# Patient Record
Sex: MALE | Race: WHITE | NOT HISPANIC OR LATINO | Employment: OTHER | URBAN - METROPOLITAN AREA
[De-identification: names, ages, dates, MRNs, and addresses within clinical notes are randomized per-mention and may not be internally consistent; named-entity substitution may affect disease eponyms.]

---

## 2019-07-23 ENCOUNTER — TRANSCRIBE ORDERS (OUTPATIENT)
Dept: ADMINISTRATIVE | Facility: HOSPITAL | Age: 69
End: 2019-07-23

## 2019-07-23 DIAGNOSIS — E04.2 MULTIPLE THYROID NODULES: Primary | ICD-10-CM

## 2019-08-06 ENCOUNTER — HOSPITAL ENCOUNTER (OUTPATIENT)
Dept: ULTRASOUND IMAGING | Facility: HOSPITAL | Age: 69
Discharge: HOME | End: 2019-08-06
Admitting: INTERNAL MEDICINE

## 2019-08-06 DIAGNOSIS — E04.2 MULTIPLE THYROID NODULES: ICD-10-CM

## 2019-08-06 PROCEDURE — 76536 US EXAM OF HEAD AND NECK: CPT

## 2019-12-17 ENCOUNTER — TRANSCRIBE ORDERS (OUTPATIENT)
Dept: ADMINISTRATIVE | Facility: HOSPITAL | Age: 69
End: 2019-12-17

## 2019-12-17 DIAGNOSIS — Z87.891 HISTORY OF SMOKING: Primary | ICD-10-CM

## 2019-12-19 ENCOUNTER — HOSPITAL ENCOUNTER (OUTPATIENT)
Dept: ULTRASOUND IMAGING | Facility: HOSPITAL | Age: 69
Discharge: HOME | End: 2019-12-19
Admitting: INTERNAL MEDICINE

## 2019-12-19 DIAGNOSIS — Z87.891 HISTORY OF SMOKING: ICD-10-CM

## 2019-12-19 PROCEDURE — 76706 US ABDL AORTA SCREEN AAA: CPT

## 2020-01-06 ENCOUNTER — OFFICE (OUTPATIENT)
Dept: URBAN - METROPOLITAN AREA CLINIC 75 | Facility: CLINIC | Age: 70
End: 2020-01-06

## 2020-01-06 VITALS
HEART RATE: 90 BPM | SYSTOLIC BLOOD PRESSURE: 128 MMHG | HEIGHT: 64 IN | DIASTOLIC BLOOD PRESSURE: 78 MMHG | WEIGHT: 188 LBS

## 2020-01-06 DIAGNOSIS — Z80.9 FAMILY HISTORY OF MALIGNANT NEOPLASM, UNSPECIFIED: ICD-10-CM

## 2020-01-06 DIAGNOSIS — R19.4 CHANGE IN BOWEL HABIT: ICD-10-CM

## 2020-01-06 DIAGNOSIS — K58.1 IRRITABLE BOWEL SYNDROME WITH CONSTIPATION: ICD-10-CM

## 2020-01-06 DIAGNOSIS — Z86.010 PERSONAL HISTORY OF COLONIC POLYPS: ICD-10-CM

## 2020-01-06 DIAGNOSIS — K21.9 GASTRO-ESOPHAGEAL REFLUX DISEASE WITHOUT ESOPHAGITIS: ICD-10-CM

## 2020-01-06 PROCEDURE — 99203 OFFICE O/P NEW LOW 30 MIN: CPT | Performed by: INTERNAL MEDICINE

## 2020-01-27 ENCOUNTER — OFFICE (OUTPATIENT)
Dept: URBAN - METROPOLITAN AREA CLINIC 75 | Facility: CLINIC | Age: 70
End: 2020-01-27

## 2020-01-27 VITALS — HEIGHT: 64 IN

## 2020-01-27 DIAGNOSIS — K64.1 SECOND DEGREE HEMORRHOIDS: ICD-10-CM

## 2020-01-27 PROCEDURE — 46221 LIGATION OF HEMORRHOID(S): CPT | Performed by: INTERNAL MEDICINE

## 2020-01-27 NOTE — SERVICEHPINOTES
69-year-old gentleman who has a history of polyps. He's also been having issues with hemorrhoids. They move in and out, he's had bleeding in the past. Symptoms are consistent with grade 2 internal hemorrhoids so he presents for hemorrhoid banding.

## 2020-02-10 ENCOUNTER — OFFICE (OUTPATIENT)
Dept: URBAN - METROPOLITAN AREA CLINIC 75 | Facility: CLINIC | Age: 70
End: 2020-02-10

## 2020-02-10 VITALS — HEIGHT: 64 IN

## 2020-02-10 DIAGNOSIS — K64.1 SECOND DEGREE HEMORRHOIDS: ICD-10-CM

## 2020-02-10 PROCEDURE — 46221 LIGATION OF HEMORRHOID(S): CPT | Performed by: INTERNAL MEDICINE

## 2020-02-10 NOTE — SERVICEHPINOTES
69-year-old gentleman who has a history of polyps. He's also been having issues with hemorrhoids. They move in and out, he's had bleeding in the past. Symptoms are consistent with grade 2 internal hemorrhoids so he presents for his 2nd hemorrhoid banding.

## 2020-02-24 ENCOUNTER — OFFICE (OUTPATIENT)
Dept: URBAN - METROPOLITAN AREA CLINIC 75 | Facility: CLINIC | Age: 70
End: 2020-02-24

## 2020-02-24 VITALS — HEIGHT: 64 IN

## 2020-02-24 DIAGNOSIS — K64.1 SECOND DEGREE HEMORRHOIDS: ICD-10-CM

## 2020-02-24 DIAGNOSIS — K92.1 MELENA: ICD-10-CM

## 2020-02-24 PROCEDURE — 46221 LIGATION OF HEMORRHOID(S): CPT | Performed by: INTERNAL MEDICINE

## 2020-06-10 ENCOUNTER — TRANSCRIBE ORDERS (OUTPATIENT)
Dept: ADMINISTRATIVE | Facility: HOSPITAL | Age: 70
End: 2020-06-10

## 2020-06-10 DIAGNOSIS — M25.561 ARTHRALGIA OF RIGHT KNEE: Primary | ICD-10-CM

## 2020-06-18 ENCOUNTER — APPOINTMENT (OUTPATIENT)
Dept: MRI IMAGING | Facility: HOSPITAL | Age: 70
End: 2020-06-18

## 2020-06-19 ENCOUNTER — APPOINTMENT (OUTPATIENT)
Dept: MRI IMAGING | Facility: HOSPITAL | Age: 70
End: 2020-06-19

## 2020-06-19 ENCOUNTER — HOSPITAL ENCOUNTER (OUTPATIENT)
Dept: MRI IMAGING | Facility: HOSPITAL | Age: 70
Discharge: HOME | End: 2020-06-19
Admitting: ORTHOPAEDIC SURGERY

## 2020-06-19 DIAGNOSIS — M25.561 ARTHRALGIA OF RIGHT KNEE: ICD-10-CM

## 2020-06-19 PROCEDURE — 73721 MRI JNT OF LWR EXTRE W/O DYE: CPT

## 2020-07-24 ENCOUNTER — TRANSCRIBE ORDERS (OUTPATIENT)
Dept: ADMINISTRATIVE | Facility: HOSPITAL | Age: 70
End: 2020-07-24

## 2020-07-24 DIAGNOSIS — E04.2 MULTIPLE THYROID NODULES: Primary | ICD-10-CM

## 2020-07-30 ENCOUNTER — HOSPITAL ENCOUNTER (OUTPATIENT)
Dept: ULTRASOUND IMAGING | Facility: HOSPITAL | Age: 70
Discharge: HOME | End: 2020-07-30
Admitting: INTERNAL MEDICINE

## 2020-07-30 DIAGNOSIS — E04.2 MULTIPLE THYROID NODULES: ICD-10-CM

## 2020-07-30 PROCEDURE — 76536 US EXAM OF HEAD AND NECK: CPT

## 2020-08-14 ENCOUNTER — TRANSCRIBE ORDERS (OUTPATIENT)
Dept: PREADMISSION TESTING | Facility: HOSPITAL | Age: 70
End: 2020-08-14

## 2020-08-14 DIAGNOSIS — Z01.818 OTHER SPECIFIED PRE-OPERATIVE EXAMINATION: Primary | ICD-10-CM

## 2020-08-18 ENCOUNTER — HOSPITAL ENCOUNTER (OUTPATIENT)
Dept: GENERAL RADIOLOGY | Facility: HOSPITAL | Age: 70
Discharge: HOME | End: 2020-08-18

## 2020-08-18 ENCOUNTER — HOSPITAL ENCOUNTER (OUTPATIENT)
Dept: GENERAL RADIOLOGY | Facility: HOSPITAL | Age: 70
Discharge: HOME | End: 2020-08-18
Admitting: ORTHOPAEDIC SURGERY

## 2020-08-18 ENCOUNTER — APPOINTMENT (OUTPATIENT)
Dept: PREADMISSION TESTING | Facility: HOSPITAL | Age: 70
End: 2020-08-18

## 2020-08-18 VITALS — WEIGHT: 193 LBS | HEIGHT: 65 IN | BODY MASS INDEX: 32.15 KG/M2

## 2020-08-18 DIAGNOSIS — M17.11 PRIMARY OSTEOARTHRITIS OF RIGHT KNEE: ICD-10-CM

## 2020-08-18 LAB
ALBUMIN SERPL-MCNC: 4.3 G/DL (ref 3.5–5.2)
ALBUMIN/GLOB SERPL: 1.6 G/DL
ALP SERPL-CCNC: 64 U/L (ref 39–117)
ALT SERPL W P-5'-P-CCNC: 26 U/L (ref 1–41)
ANION GAP SERPL CALCULATED.3IONS-SCNC: 8 MMOL/L (ref 5–15)
AST SERPL-CCNC: 25 U/L (ref 1–40)
BILIRUB SERPL-MCNC: 0.9 MG/DL (ref 0–1.2)
BILIRUB UR QL STRIP: NEGATIVE
BUN SERPL-MCNC: 19 MG/DL (ref 8–23)
BUN/CREAT SERPL: 20.9 (ref 7–25)
CALCIUM SPEC-SCNC: 9.6 MG/DL (ref 8.6–10.5)
CHLORIDE SERPL-SCNC: 103 MMOL/L (ref 98–107)
CLARITY UR: CLEAR
CO2 SERPL-SCNC: 29 MMOL/L (ref 22–29)
COLOR UR: YELLOW
CREAT SERPL-MCNC: 0.91 MG/DL (ref 0.76–1.27)
DEPRECATED RDW RBC AUTO: 43.5 FL (ref 37–54)
ERYTHROCYTE [DISTWIDTH] IN BLOOD BY AUTOMATED COUNT: 13.1 % (ref 12.3–15.4)
GFR SERPL CREATININE-BSD FRML MDRD: 83 ML/MIN/1.73
GLOBULIN UR ELPH-MCNC: 2.7 GM/DL
GLUCOSE SERPL-MCNC: 109 MG/DL (ref 65–99)
GLUCOSE UR STRIP-MCNC: NEGATIVE MG/DL
HCT VFR BLD AUTO: 38.4 % (ref 37.5–51)
HGB BLD-MCNC: 13.3 G/DL (ref 13–17.7)
HGB UR QL STRIP.AUTO: NEGATIVE
INR PPP: 0.97 (ref 0.9–1.1)
KETONES UR QL STRIP: NEGATIVE
LEUKOCYTE ESTERASE UR QL STRIP.AUTO: NEGATIVE
MCH RBC QN AUTO: 31.3 PG (ref 26.6–33)
MCHC RBC AUTO-ENTMCNC: 34.6 G/DL (ref 31.5–35.7)
MCV RBC AUTO: 90.4 FL (ref 79–97)
NITRITE UR QL STRIP: NEGATIVE
PH UR STRIP.AUTO: 6.5 [PH] (ref 5–8)
PLATELET # BLD AUTO: 205 10*3/MM3 (ref 140–450)
PMV BLD AUTO: 9.8 FL (ref 6–12)
POTASSIUM SERPL-SCNC: 4.1 MMOL/L (ref 3.5–5.2)
PROT SERPL-MCNC: 7 G/DL (ref 6–8.5)
PROT UR QL STRIP: NEGATIVE
PROTHROMBIN TIME: 12.8 SECONDS (ref 11.7–14.2)
RBC # BLD AUTO: 4.25 10*6/MM3 (ref 4.14–5.8)
SODIUM SERPL-SCNC: 140 MMOL/L (ref 136–145)
SP GR UR STRIP: 1.01 (ref 1–1.03)
UROBILINOGEN UR QL STRIP: NORMAL
WBC # BLD AUTO: 6.78 10*3/MM3 (ref 3.4–10.8)

## 2020-08-18 PROCEDURE — 80053 COMPREHEN METABOLIC PANEL: CPT | Performed by: ORTHOPAEDIC SURGERY

## 2020-08-18 PROCEDURE — 73560 X-RAY EXAM OF KNEE 1 OR 2: CPT

## 2020-08-18 PROCEDURE — 71046 X-RAY EXAM CHEST 2 VIEWS: CPT

## 2020-08-18 PROCEDURE — 85610 PROTHROMBIN TIME: CPT | Performed by: ORTHOPAEDIC SURGERY

## 2020-08-18 PROCEDURE — 36415 COLL VENOUS BLD VENIPUNCTURE: CPT

## 2020-08-18 PROCEDURE — 93010 ELECTROCARDIOGRAM REPORT: CPT | Performed by: INTERNAL MEDICINE

## 2020-08-18 PROCEDURE — 93005 ELECTROCARDIOGRAM TRACING: CPT

## 2020-08-18 PROCEDURE — 85027 COMPLETE CBC AUTOMATED: CPT | Performed by: ORTHOPAEDIC SURGERY

## 2020-08-18 PROCEDURE — 81003 URINALYSIS AUTO W/O SCOPE: CPT | Performed by: ORTHOPAEDIC SURGERY

## 2020-08-18 RX ORDER — TRIAMCINOLONE ACETONIDE 55 UG/1
2 SPRAY, METERED NASAL NIGHTLY
COMMUNITY

## 2020-08-18 RX ORDER — OMEPRAZOLE 20 MG/1
20 CAPSULE, DELAYED RELEASE ORAL
COMMUNITY

## 2020-08-18 RX ORDER — PHENOL 1.4 %
10 AEROSOL, SPRAY (ML) MUCOUS MEMBRANE NIGHTLY
COMMUNITY

## 2020-08-18 RX ORDER — ASPIRIN 81 MG/1
81 TABLET ORAL DAILY
COMMUNITY
End: 2020-09-03 | Stop reason: HOSPADM

## 2020-08-18 RX ORDER — DEXTRIN 3 G/3.5 G
POWDER (GRAM) ORAL DAILY
COMMUNITY

## 2020-08-18 ASSESSMENT — KOOS JR
KOOS JR SCORE: 57.14
KOOS JR SCORE: 12

## 2020-08-18 NOTE — DISCHARGE INSTRUCTIONS
Take the following medications the morning of surgery:  NONE    If you are on prescription narcotic pain medication to control your pain you may also take that medication the morning of surgery.    General Instructions: CLEAR LIQUIDS UNTIL 4:15 AM MORNING OF SURGERY  • Do not eat solid food after midnight the night before surgery.  • You may drink clear liquids day of surgery but must stop at least one hour before your hospital arrival time.  • It is beneficial for you to have a clear drink that contains carbohydrates the day of surgery.  We suggest a 12 to 20 ounce bottle of Gatorade or Powerade for non-diabetic patients or a 12 to 20 ounce bottle of G2 or Powerade Zero for diabetic patients. (Pediatric patients, are not advised to drink a 12 to 20 ounce carbohydrate drink)    Clear liquids are liquids you can see through.  Nothing red in color.     Plain water                               Sports drinks  Sodas                                   Gelatin (Jell-O)  Fruit juices without pulp such as white grape juice and apple juice  Popsicles that contain no fruit or yogurt  Tea or coffee (no cream or milk added)  Gatorade / Powerade  G2 / Powerade Zero    • Infants may have breast milk up to four hours before surgery.  • Infants drinking formula may drink formula up to six hours before surgery.   • Patients who avoid smoking, chewing tobacco and alcohol for 4 weeks prior to surgery have a reduced risk of post-operative complications.  Quit smoking as many days before surgery as you can.  • Do not smoke, use chewing tobacco or drink alcohol the day of surgery.   • If applicable bring your C-PAP/ BI-PAP machine.  • Bring any papers given to you in the doctor’s office.  • Wear clean comfortable clothes.  • Do not wear contact lenses, false eyelashes or make-up.  Bring a case for your glasses.   • Bring crutches or walker if applicable.  • Remove all piercings.  Leave jewelry and any other valuables at home.  • Hair  extensions with metal clips must be removed prior to surgery.  • The Pre-Admission Testing nurse will instruct you to bring medications if unable to obtain an accurate list in Pre-Admission Testing.        If you were given a blood bank ID arm band remember to bring it with you the day of surgery.    Preventing a Surgical Site Infection:  • For 2 to 3 days before surgery, avoid shaving with a razor because the razor can irritate skin and make it easier to develop an infection.    • Any areas of open skin can increase the risk of a post-operative wound infection by allowing bacteria to enter and travel throughout the body.  Notify your surgeon if you have any skin wounds / rashes even if it is not near the expected surgical site.  The area will need assessed to determine if surgery should be delayed until it is healed.  • The night prior to surgery shower using a fresh bar of anti-bacterial soap (such as Dial) and clean washcloth.  Sleep in a clean bed with clean clothing.  Do not allow pets to sleep with you.  • Shower on the morning of surgery using a fresh bar of anti-bacterial soap (such as Dial) and clean washcloth.  Dry with a clean towel and dress in clean clothing.  • Ask your surgeon if you will be receiving antibiotics prior to surgery.  • Make sure you, your family, and all healthcare providers clean their hands with soap and water or an alcohol based hand  before caring for you or your wound.    Day of surgery: 9/1/2020 ARRIVAL TIME 5:15 AM  Your arrival time is approximately two hours before your scheduled surgery time.  Upon arrival, a Pre-op nurse and Anesthesiologist will review your health history, obtain vital signs, and answer questions you may have.  The only belongings needed at this time will be a list of your home medications and if applicable your C-PAP/BI-PAP machine.  If you are staying overnight your family can leave the rest of your belongings in the car and bring them to your  room later.  A Pre-op nurse will start an IV and you may receive medication in preparation for surgery, including something to help you relax.  Your family will be able to see you in the Pre-op area.  Two visitors at a time will be allowed in the Pre-op room.  While you are in surgery your family should notify the waiting room  if they leave the waiting room area and provide a contact phone number.    Please be aware that surgery does come with discomfort.  We want to make every effort to control your discomfort so please discuss any uncontrolled symptoms with your nurse.   Your doctor will most likely have prescribed pain medications.      If you are going home after surgery you will receive individualized written care instructions before being discharged.  A responsible adult must drive you to and from the hospital on the day of your surgery and stay with you for 24 hours.    If you are staying overnight following surgery, you will be transported to your hospital room following the recovery period.  [facility] has all private rooms.    If you have any questions please call Pre-Admission Testing at [phone].  Deductibles and co-payments are collected on the day of service. Please be prepared to pay the required co-pay, deductible or deposit on the day of service as defined by your plan.    Patient Education for Self-Quarantine Process    Following your COVID testing, we strongly recommend that you do not leave your home after you have been tested for COVID except to get medical care. This includes not going to work, school or to public areas.  If this is not possible for you to do please limit your activities to only required outings.  Be sure to wear a mask when you are with other people, practice social distancing and wash your hands frequently.      The following items provide additional details to keep you safe.  • Wash your hands with soap and water frequently for at least 20  seconds.   • Avoid touching your eyes, nose and mouth with unwashed hands.  • Do not share anything - utensils, towels, food from the same bowl.   • Have your own utensils, drinking glass, dishes, towels and bedding.   • Do not have visitors.   • Do use FaceTime to stay in touch with family and friends.  • You should stay in a specific room away from others if possible.   • Stay at least 6 feet away from others in the home if you cannot have a dedicated room to yourself.   • Do not snuggle with your pet. While the CDC says there is no evidence that pets can spread COVID-19 or be infected from humans, it is probably best to avoid “petting, snuggling, being kissed or licked and sharing food (during self-quarantine)”, according to the CDC.   • Sanitize household surfaces daily. Include all high touch areas (door handles, light switches, phones, countertops, etc.)  • Do not share a bathroom with others, if possible.   • Wear a mask around others in your home if you are unable to stay in a separate room or 6 feet apart. If  you are unable to wear a mask, have your family member wear a mask if they must be within 6 feet of you.   Call your surgeon immediately if you experience any of the following symptoms:  • Sore Throat  • Shortness of Breath or difficulty breathing  • Cough  • Chills  • Body soreness or muscle pain  • Headache  • Fever  • New loss of taste or smell  • Do not arrive for your surgery ill.  Your procedure will need to be rescheduled to another time.  You will need to call your physician before the day of surgery to avoid any unnecessary exposure to hospital staff as well as other patients.    CHLORHEXIDINE CLOTH INSTRUCTIONS  The morning of surgery follow these instructions using the Chlorhexidine cloths you've been given.  These steps reduce bacteria on the body.  Do not use the cloths near your eyes, ears mouth, genitalia or on open wounds.  Throw the cloths away after use but do not try to flush them  down a toilet.      • Open and remove one cloth at a time from the package.    • Leave the cloth unfolded and begin the bathing.  • Massage the skin with the cloths using gentle pressure to remove bacteria.  Do not scrub harshly.   • Follow the steps below with one 2% CHG cloth per area (6 total cloths).  • One cloth for neck, shoulders and chest.  • One cloth for both arms, hands, fingers and underarms (do underarms last).  • One cloth for the abdomen followed by groin.  • One cloth for right leg and foot including between the toes.  • One cloth for left leg and foot including between the toes.  • The last cloth is to be used for the back of the neck, back and buttocks.    Allow the CHG to air dry 3 minutes on the skin which will give it time to work and decrease the chance of irritation.  The skin may feel sticky until it is dry.  Do not rinse with water or any other liquid or you will lose the beneficial effects of the CHG.  If mild skin irritation occurs, do rinse the skin to remove the CHG.  Report this to the nurse at time of admission.  Do not apply lotions, creams, ointments, deodorants or perfumes after using the clothes. Dress in clean clothes before coming to the hospital.    BACTROBAN NASAL OINTMENT  There are many germs normally in your nose. Bactroban is an ointment that will help reduce these germs. Please follow these instructions for Bactroban use:      ____The day before surgery in the morning  Date________    ____The day before surgery in the evening              Date________    ____The day of surgery in the morning    Date________    **Squirt ½ package of Bactroban Ointment onto a cotton applicator and apply to inside of 1st nostril.  Squirt the remaining Bactroban and apply to the inside of the other nostril.

## 2020-08-29 ENCOUNTER — LAB (OUTPATIENT)
Dept: LAB | Facility: HOSPITAL | Age: 70
End: 2020-08-29

## 2020-08-29 DIAGNOSIS — Z01.818 OTHER SPECIFIED PRE-OPERATIVE EXAMINATION: ICD-10-CM

## 2020-08-29 PROCEDURE — C9803 HOPD COVID-19 SPEC COLLECT: HCPCS

## 2020-08-29 PROCEDURE — U0004 COV-19 TEST NON-CDC HGH THRU: HCPCS

## 2020-08-31 LAB
REF LAB TEST METHOD: NORMAL
SARS-COV-2 RNA RESP QL NAA+PROBE: NOT DETECTED

## 2020-09-01 ENCOUNTER — HOSPITAL ENCOUNTER (INPATIENT)
Facility: HOSPITAL | Age: 70
LOS: 2 days | Discharge: HOME HEALTH | End: 2020-09-03
Attending: ORTHOPAEDIC SURGERY | Admitting: ORTHOPAEDIC SURGERY

## 2020-09-01 ENCOUNTER — ANESTHESIA (OUTPATIENT)
Dept: PERIOP | Facility: HOSPITAL | Age: 70
End: 2020-09-01

## 2020-09-01 ENCOUNTER — APPOINTMENT (OUTPATIENT)
Dept: GENERAL RADIOLOGY | Facility: HOSPITAL | Age: 70
End: 2020-09-01

## 2020-09-01 ENCOUNTER — ANESTHESIA EVENT (OUTPATIENT)
Dept: PERIOP | Facility: HOSPITAL | Age: 70
End: 2020-09-01

## 2020-09-01 DIAGNOSIS — M17.11 PRIMARY OSTEOARTHRITIS OF RIGHT KNEE: Primary | ICD-10-CM

## 2020-09-01 LAB — GLUCOSE BLDC GLUCOMTR-MCNC: 105 MG/DL (ref 70–130)

## 2020-09-01 PROCEDURE — 25010000002 HYDROMORPHONE PER 4 MG: Performed by: NURSE ANESTHETIST, CERTIFIED REGISTERED

## 2020-09-01 PROCEDURE — 0SRC0J9 REPLACEMENT OF RIGHT KNEE JOINT WITH SYNTHETIC SUBSTITUTE, CEMENTED, OPEN APPROACH: ICD-10-PCS | Performed by: ORTHOPAEDIC SURGERY

## 2020-09-01 PROCEDURE — C1776 JOINT DEVICE (IMPLANTABLE): HCPCS | Performed by: ORTHOPAEDIC SURGERY

## 2020-09-01 PROCEDURE — 25010000002 FENTANYL CITRATE (PF) 100 MCG/2ML SOLUTION: Performed by: ANESTHESIOLOGY

## 2020-09-01 PROCEDURE — 25010000002 MIDAZOLAM PER 1 MG: Performed by: ANESTHESIOLOGY

## 2020-09-01 PROCEDURE — 25010000002 KETOROLAC TROMETHAMINE PER 15 MG: Performed by: ORTHOPAEDIC SURGERY

## 2020-09-01 PROCEDURE — 25010000002 ROPIVACAINE PER 1 MG: Performed by: ORTHOPAEDIC SURGERY

## 2020-09-01 PROCEDURE — 94799 UNLISTED PULMONARY SVC/PX: CPT

## 2020-09-01 PROCEDURE — 25010000003 MEPIVACAINE PER 10 ML: Performed by: ANESTHESIOLOGY

## 2020-09-01 PROCEDURE — 73560 X-RAY EXAM OF KNEE 1 OR 2: CPT

## 2020-09-01 PROCEDURE — 97161 PT EVAL LOW COMPLEX 20 MIN: CPT

## 2020-09-01 PROCEDURE — 25010000002 DEXAMETHASONE PER 1 MG: Performed by: NURSE ANESTHETIST, CERTIFIED REGISTERED

## 2020-09-01 PROCEDURE — 25010000002 PROPOFOL 10 MG/ML EMULSION: Performed by: NURSE ANESTHETIST, CERTIFIED REGISTERED

## 2020-09-01 PROCEDURE — 25010000003 CEFAZOLIN IN DEXTROSE 2-4 GM/100ML-% SOLUTION: Performed by: ORTHOPAEDIC SURGERY

## 2020-09-01 PROCEDURE — 25010000002 FENTANYL CITRATE (PF) 100 MCG/2ML SOLUTION: Performed by: NURSE ANESTHETIST, CERTIFIED REGISTERED

## 2020-09-01 PROCEDURE — 97110 THERAPEUTIC EXERCISES: CPT

## 2020-09-01 PROCEDURE — 25010000002 ONDANSETRON PER 1 MG: Performed by: NURSE ANESTHETIST, CERTIFIED REGISTERED

## 2020-09-01 PROCEDURE — 25010000002 EPINEPHRINE 30 MG/30ML SOLUTION 30 ML VIAL: Performed by: ORTHOPAEDIC SURGERY

## 2020-09-01 PROCEDURE — C1713 ANCHOR/SCREW BN/BN,TIS/BN: HCPCS | Performed by: ORTHOPAEDIC SURGERY

## 2020-09-01 PROCEDURE — 76942 ECHO GUIDE FOR BIOPSY: CPT | Performed by: ORTHOPAEDIC SURGERY

## 2020-09-01 PROCEDURE — 25010000002 NEOSTIGMINE PER 0.5 MG: Performed by: NURSE ANESTHETIST, CERTIFIED REGISTERED

## 2020-09-01 PROCEDURE — 82962 GLUCOSE BLOOD TEST: CPT

## 2020-09-01 PROCEDURE — 25010000002 ROPIVACAINE PER 1 MG: Performed by: ANESTHESIOLOGY

## 2020-09-01 PROCEDURE — 25010000002 MORPHINE (PF) 10 MG/ML SOLUTION 1 ML CARTRIDGE: Performed by: ORTHOPAEDIC SURGERY

## 2020-09-01 PROCEDURE — 25010000002 ONDANSETRON PER 1 MG: Performed by: ORTHOPAEDIC SURGERY

## 2020-09-01 DEVICE — IMPLANTABLE DEVICE
Type: IMPLANTABLE DEVICE | Site: KNEE | Status: FUNCTIONAL
Brand: VANGUARD® KNEE SYSTEM

## 2020-09-01 DEVICE — CMT BONE R 1X40: Type: IMPLANTABLE DEVICE | Site: KNEE | Status: FUNCTIONAL

## 2020-09-01 DEVICE — CAP TOTL KN CMT PREMIUM: Type: IMPLANTABLE DEVICE | Site: KNEE | Status: FUNCTIONAL

## 2020-09-01 DEVICE — IMPLANTABLE DEVICE
Type: IMPLANTABLE DEVICE | Site: KNEE | Status: FUNCTIONAL
Brand: BIOMET® KNEE SYSTEM

## 2020-09-01 DEVICE — IMPLANTABLE DEVICE
Type: IMPLANTABLE DEVICE | Site: KNEE | Status: FUNCTIONAL
Brand: BIOMET KNEE SYSTEM

## 2020-09-01 RX ORDER — ASPIRIN 325 MG
325 TABLET, DELAYED RELEASE (ENTERIC COATED) ORAL 2 TIMES DAILY WITH MEALS
Status: DISCONTINUED | OUTPATIENT
Start: 2020-09-01 | End: 2020-09-03 | Stop reason: HOSPADM

## 2020-09-01 RX ORDER — HYDROMORPHONE HCL 110MG/55ML
PATIENT CONTROLLED ANALGESIA SYRINGE INTRAVENOUS AS NEEDED
Status: DISCONTINUED | OUTPATIENT
Start: 2020-09-01 | End: 2020-09-01 | Stop reason: SURG

## 2020-09-01 RX ORDER — SODIUM CHLORIDE, SODIUM LACTATE, POTASSIUM CHLORIDE, CALCIUM CHLORIDE 600; 310; 30; 20 MG/100ML; MG/100ML; MG/100ML; MG/100ML
9 INJECTION, SOLUTION INTRAVENOUS CONTINUOUS
Status: DISCONTINUED | OUTPATIENT
Start: 2020-09-01 | End: 2020-09-03 | Stop reason: HOSPADM

## 2020-09-01 RX ORDER — ONDANSETRON 2 MG/ML
4 INJECTION INTRAMUSCULAR; INTRAVENOUS EVERY 6 HOURS PRN
Status: DISCONTINUED | OUTPATIENT
Start: 2020-09-01 | End: 2020-09-03 | Stop reason: HOSPADM

## 2020-09-01 RX ORDER — IPRATROPIUM BROMIDE AND ALBUTEROL SULFATE 2.5; .5 MG/3ML; MG/3ML
3 SOLUTION RESPIRATORY (INHALATION) ONCE AS NEEDED
Status: DISCONTINUED | OUTPATIENT
Start: 2020-09-01 | End: 2020-09-01 | Stop reason: HOSPADM

## 2020-09-01 RX ORDER — HYDROMORPHONE HYDROCHLORIDE 1 MG/ML
0.5 INJECTION, SOLUTION INTRAMUSCULAR; INTRAVENOUS; SUBCUTANEOUS
Status: DISCONTINUED | OUTPATIENT
Start: 2020-09-01 | End: 2020-09-01 | Stop reason: HOSPADM

## 2020-09-01 RX ORDER — GLYCOPYRROLATE 0.2 MG/ML
INJECTION INTRAMUSCULAR; INTRAVENOUS AS NEEDED
Status: DISCONTINUED | OUTPATIENT
Start: 2020-09-01 | End: 2020-09-01 | Stop reason: SURG

## 2020-09-01 RX ORDER — FAMOTIDINE 10 MG/ML
20 INJECTION, SOLUTION INTRAVENOUS ONCE
Status: COMPLETED | OUTPATIENT
Start: 2020-09-01 | End: 2020-09-01

## 2020-09-01 RX ORDER — CLINDAMYCIN PHOSPHATE 900 MG/50ML
900 INJECTION INTRAVENOUS EVERY 8 HOURS
Status: COMPLETED | OUTPATIENT
Start: 2020-09-01 | End: 2020-09-02

## 2020-09-01 RX ORDER — SODIUM CHLORIDE 0.9 % (FLUSH) 0.9 %
3 SYRINGE (ML) INJECTION EVERY 12 HOURS SCHEDULED
Status: DISCONTINUED | OUTPATIENT
Start: 2020-09-01 | End: 2020-09-01 | Stop reason: HOSPADM

## 2020-09-01 RX ORDER — HYDROCODONE BITARTRATE AND ACETAMINOPHEN 7.5; 325 MG/1; MG/1
1 TABLET ORAL ONCE AS NEEDED
Status: DISCONTINUED | OUTPATIENT
Start: 2020-09-01 | End: 2020-09-01 | Stop reason: HOSPADM

## 2020-09-01 RX ORDER — PANTOPRAZOLE SODIUM 40 MG/1
40 TABLET, DELAYED RELEASE ORAL EVERY MORNING
Status: DISCONTINUED | OUTPATIENT
Start: 2020-09-02 | End: 2020-09-03 | Stop reason: HOSPADM

## 2020-09-01 RX ORDER — LABETALOL HYDROCHLORIDE 5 MG/ML
5 INJECTION, SOLUTION INTRAVENOUS
Status: DISCONTINUED | OUTPATIENT
Start: 2020-09-01 | End: 2020-09-01 | Stop reason: HOSPADM

## 2020-09-01 RX ORDER — FLUTICASONE PROPIONATE 50 MCG
2 SPRAY, SUSPENSION (ML) NASAL DAILY
Status: DISCONTINUED | OUTPATIENT
Start: 2020-09-01 | End: 2020-09-03 | Stop reason: HOSPADM

## 2020-09-01 RX ORDER — ROCURONIUM BROMIDE 10 MG/ML
INJECTION, SOLUTION INTRAVENOUS AS NEEDED
Status: DISCONTINUED | OUTPATIENT
Start: 2020-09-01 | End: 2020-09-01 | Stop reason: SURG

## 2020-09-01 RX ORDER — OXYCODONE HYDROCHLORIDE AND ACETAMINOPHEN 5; 325 MG/1; MG/1
1-2 TABLET ORAL EVERY 4 HOURS PRN
Qty: 84 TABLET | Refills: 0 | Status: SHIPPED | OUTPATIENT
Start: 2020-09-01

## 2020-09-01 RX ORDER — LIDOCAINE HYDROCHLORIDE 10 MG/ML
0.5 INJECTION, SOLUTION EPIDURAL; INFILTRATION; INTRACAUDAL; PERINEURAL ONCE AS NEEDED
Status: DISCONTINUED | OUTPATIENT
Start: 2020-09-01 | End: 2020-09-01 | Stop reason: HOSPADM

## 2020-09-01 RX ORDER — OXYCODONE HYDROCHLORIDE AND ACETAMINOPHEN 5; 325 MG/1; MG/1
1 TABLET ORAL EVERY 4 HOURS PRN
Status: DISCONTINUED | OUTPATIENT
Start: 2020-09-01 | End: 2020-09-03 | Stop reason: HOSPADM

## 2020-09-01 RX ORDER — MAGNESIUM HYDROXIDE 1200 MG/15ML
LIQUID ORAL AS NEEDED
Status: DISCONTINUED | OUTPATIENT
Start: 2020-09-01 | End: 2020-09-01 | Stop reason: HOSPADM

## 2020-09-01 RX ORDER — FLUMAZENIL 0.1 MG/ML
0.2 INJECTION INTRAVENOUS AS NEEDED
Status: DISCONTINUED | OUTPATIENT
Start: 2020-09-01 | End: 2020-09-01 | Stop reason: HOSPADM

## 2020-09-01 RX ORDER — DEXAMETHASONE SODIUM PHOSPHATE 10 MG/ML
INJECTION INTRAMUSCULAR; INTRAVENOUS AS NEEDED
Status: DISCONTINUED | OUTPATIENT
Start: 2020-09-01 | End: 2020-09-01 | Stop reason: SURG

## 2020-09-01 RX ORDER — ROPIVACAINE HYDROCHLORIDE 5 MG/ML
INJECTION, SOLUTION EPIDURAL; INFILTRATION; PERINEURAL
Status: COMPLETED | OUTPATIENT
Start: 2020-09-01 | End: 2020-09-01

## 2020-09-01 RX ORDER — MIDAZOLAM HYDROCHLORIDE 1 MG/ML
1 INJECTION INTRAMUSCULAR; INTRAVENOUS
Status: DISCONTINUED | OUTPATIENT
Start: 2020-09-01 | End: 2020-09-01 | Stop reason: HOSPADM

## 2020-09-01 RX ORDER — TRANEXAMIC ACID 100 MG/ML
INJECTION, SOLUTION INTRAVENOUS AS NEEDED
Status: DISCONTINUED | OUTPATIENT
Start: 2020-09-01 | End: 2020-09-01 | Stop reason: SURG

## 2020-09-01 RX ORDER — DIPHENHYDRAMINE HYDROCHLORIDE 50 MG/ML
12.5 INJECTION INTRAMUSCULAR; INTRAVENOUS
Status: DISCONTINUED | OUTPATIENT
Start: 2020-09-01 | End: 2020-09-01 | Stop reason: HOSPADM

## 2020-09-01 RX ORDER — PROMETHAZINE HYDROCHLORIDE 25 MG/1
25 SUPPOSITORY RECTAL ONCE AS NEEDED
Status: DISCONTINUED | OUTPATIENT
Start: 2020-09-01 | End: 2020-09-01 | Stop reason: HOSPADM

## 2020-09-01 RX ORDER — NALOXONE HCL 0.4 MG/ML
0.4 VIAL (ML) INJECTION
Status: DISCONTINUED | OUTPATIENT
Start: 2020-09-01 | End: 2020-09-03 | Stop reason: HOSPADM

## 2020-09-01 RX ORDER — CLOBETASOL PROPIONATE 0.5 MG/G
OINTMENT TOPICAL EVERY 12 HOURS SCHEDULED
Status: DISCONTINUED | OUTPATIENT
Start: 2020-09-01 | End: 2020-09-03 | Stop reason: HOSPADM

## 2020-09-01 RX ORDER — CHOLECALCIFEROL (VITAMIN D3) 125 MCG
5 CAPSULE ORAL NIGHTLY PRN
Status: DISCONTINUED | OUTPATIENT
Start: 2020-09-01 | End: 2020-09-03 | Stop reason: HOSPADM

## 2020-09-01 RX ORDER — DIAZEPAM 5 MG/1
5 TABLET ORAL EVERY 6 HOURS PRN
Status: DISCONTINUED | OUTPATIENT
Start: 2020-09-01 | End: 2020-09-03 | Stop reason: HOSPADM

## 2020-09-01 RX ORDER — EPHEDRINE SULFATE 50 MG/ML
5 INJECTION, SOLUTION INTRAVENOUS ONCE AS NEEDED
Status: DISCONTINUED | OUTPATIENT
Start: 2020-09-01 | End: 2020-09-01 | Stop reason: HOSPADM

## 2020-09-01 RX ORDER — PROPOFOL 10 MG/ML
VIAL (ML) INTRAVENOUS AS NEEDED
Status: DISCONTINUED | OUTPATIENT
Start: 2020-09-01 | End: 2020-09-01 | Stop reason: SURG

## 2020-09-01 RX ORDER — PROMETHAZINE HYDROCHLORIDE 25 MG/1
25 TABLET ORAL ONCE AS NEEDED
Status: DISCONTINUED | OUTPATIENT
Start: 2020-09-01 | End: 2020-09-01 | Stop reason: HOSPADM

## 2020-09-01 RX ORDER — SODIUM CHLORIDE 0.9 % (FLUSH) 0.9 %
3 SYRINGE (ML) INJECTION EVERY 12 HOURS SCHEDULED
Status: DISCONTINUED | OUTPATIENT
Start: 2020-09-01 | End: 2020-09-03 | Stop reason: HOSPADM

## 2020-09-01 RX ORDER — KETOROLAC TROMETHAMINE 30 MG/ML
15 INJECTION, SOLUTION INTRAMUSCULAR; INTRAVENOUS EVERY 8 HOURS PRN
Status: DISCONTINUED | OUTPATIENT
Start: 2020-09-01 | End: 2020-09-03 | Stop reason: HOSPADM

## 2020-09-01 RX ORDER — PROMETHAZINE HYDROCHLORIDE 12.5 MG/1
12.5 TABLET ORAL EVERY 6 HOURS PRN
Status: DISCONTINUED | OUTPATIENT
Start: 2020-09-01 | End: 2020-09-03 | Stop reason: HOSPADM

## 2020-09-01 RX ORDER — DIPHENHYDRAMINE HCL 25 MG
25 CAPSULE ORAL
Status: DISCONTINUED | OUTPATIENT
Start: 2020-09-01 | End: 2020-09-01 | Stop reason: HOSPADM

## 2020-09-01 RX ORDER — OXYCODONE HYDROCHLORIDE AND ACETAMINOPHEN 5; 325 MG/1; MG/1
2 TABLET ORAL EVERY 4 HOURS PRN
Status: DISCONTINUED | OUTPATIENT
Start: 2020-09-01 | End: 2020-09-03 | Stop reason: HOSPADM

## 2020-09-01 RX ORDER — SODIUM CHLORIDE 0.9 % (FLUSH) 0.9 %
3-10 SYRINGE (ML) INJECTION AS NEEDED
Status: DISCONTINUED | OUTPATIENT
Start: 2020-09-01 | End: 2020-09-01 | Stop reason: HOSPADM

## 2020-09-01 RX ORDER — NALOXONE HCL 0.4 MG/ML
0.2 VIAL (ML) INJECTION AS NEEDED
Status: DISCONTINUED | OUTPATIENT
Start: 2020-09-01 | End: 2020-09-01 | Stop reason: HOSPADM

## 2020-09-01 RX ORDER — LIDOCAINE HYDROCHLORIDE 20 MG/ML
INJECTION, SOLUTION INFILTRATION; PERINEURAL AS NEEDED
Status: DISCONTINUED | OUTPATIENT
Start: 2020-09-01 | End: 2020-09-01 | Stop reason: SURG

## 2020-09-01 RX ORDER — MORPHINE SULFATE 2 MG/ML
4 INJECTION, SOLUTION INTRAMUSCULAR; INTRAVENOUS
Status: DISCONTINUED | OUTPATIENT
Start: 2020-09-01 | End: 2020-09-03 | Stop reason: HOSPADM

## 2020-09-01 RX ORDER — ONDANSETRON 2 MG/ML
INJECTION INTRAMUSCULAR; INTRAVENOUS AS NEEDED
Status: DISCONTINUED | OUTPATIENT
Start: 2020-09-01 | End: 2020-09-01 | Stop reason: SURG

## 2020-09-01 RX ORDER — ACETAMINOPHEN 325 MG/1
325 TABLET ORAL EVERY 4 HOURS PRN
Status: DISCONTINUED | OUTPATIENT
Start: 2020-09-01 | End: 2020-09-03 | Stop reason: HOSPADM

## 2020-09-01 RX ORDER — EPHEDRINE SULFATE 50 MG/ML
INJECTION, SOLUTION INTRAVENOUS AS NEEDED
Status: DISCONTINUED | OUTPATIENT
Start: 2020-09-01 | End: 2020-09-01 | Stop reason: SURG

## 2020-09-01 RX ORDER — CEFAZOLIN SODIUM 2 G/100ML
2 INJECTION, SOLUTION INTRAVENOUS ONCE
Status: COMPLETED | OUTPATIENT
Start: 2020-09-01 | End: 2020-09-01

## 2020-09-01 RX ORDER — FENTANYL CITRATE 50 UG/ML
INJECTION, SOLUTION INTRAMUSCULAR; INTRAVENOUS AS NEEDED
Status: DISCONTINUED | OUTPATIENT
Start: 2020-09-01 | End: 2020-09-01 | Stop reason: SURG

## 2020-09-01 RX ORDER — ATORVASTATIN CALCIUM 20 MG/1
20 TABLET, FILM COATED ORAL NIGHTLY
Status: DISCONTINUED | OUTPATIENT
Start: 2020-09-01 | End: 2020-09-03 | Stop reason: HOSPADM

## 2020-09-01 RX ORDER — HYDRALAZINE HYDROCHLORIDE 20 MG/ML
5 INJECTION INTRAMUSCULAR; INTRAVENOUS
Status: DISCONTINUED | OUTPATIENT
Start: 2020-09-01 | End: 2020-09-01 | Stop reason: HOSPADM

## 2020-09-01 RX ORDER — OXYCODONE AND ACETAMINOPHEN 7.5; 325 MG/1; MG/1
1 TABLET ORAL ONCE AS NEEDED
Status: DISCONTINUED | OUTPATIENT
Start: 2020-09-01 | End: 2020-09-01 | Stop reason: HOSPADM

## 2020-09-01 RX ORDER — FERROUS SULFATE 325(65) MG
325 TABLET ORAL
Status: DISCONTINUED | OUTPATIENT
Start: 2020-09-02 | End: 2020-09-03 | Stop reason: HOSPADM

## 2020-09-01 RX ORDER — ONDANSETRON 4 MG/1
4 TABLET, FILM COATED ORAL EVERY 6 HOURS PRN
Status: DISCONTINUED | OUTPATIENT
Start: 2020-09-01 | End: 2020-09-03 | Stop reason: HOSPADM

## 2020-09-01 RX ORDER — SODIUM CHLORIDE 0.9 % (FLUSH) 0.9 %
1-10 SYRINGE (ML) INJECTION AS NEEDED
Status: DISCONTINUED | OUTPATIENT
Start: 2020-09-01 | End: 2020-09-03 | Stop reason: HOSPADM

## 2020-09-01 RX ORDER — ACETAMINOPHEN 500 MG
1000 TABLET ORAL ONCE
Status: COMPLETED | OUTPATIENT
Start: 2020-09-01 | End: 2020-09-01

## 2020-09-01 RX ORDER — ONDANSETRON 2 MG/ML
4 INJECTION INTRAMUSCULAR; INTRAVENOUS ONCE AS NEEDED
Status: DISCONTINUED | OUTPATIENT
Start: 2020-09-01 | End: 2020-09-01 | Stop reason: HOSPADM

## 2020-09-01 RX ORDER — DOCUSATE SODIUM 100 MG/1
100 CAPSULE, LIQUID FILLED ORAL 2 TIMES DAILY PRN
Status: DISCONTINUED | OUTPATIENT
Start: 2020-09-01 | End: 2020-09-03 | Stop reason: HOSPADM

## 2020-09-01 RX ORDER — FENTANYL CITRATE 50 UG/ML
50 INJECTION, SOLUTION INTRAMUSCULAR; INTRAVENOUS
Status: DISCONTINUED | OUTPATIENT
Start: 2020-09-01 | End: 2020-09-01 | Stop reason: HOSPADM

## 2020-09-01 RX ORDER — SODIUM CHLORIDE 450 MG/100ML
100 INJECTION, SOLUTION INTRAVENOUS CONTINUOUS
Status: DISCONTINUED | OUTPATIENT
Start: 2020-09-01 | End: 2020-09-03 | Stop reason: HOSPADM

## 2020-09-01 RX ADMIN — LIDOCAINE HYDROCHLORIDE 100 MG: 20 INJECTION, SOLUTION INFILTRATION; PERINEURAL at 07:12

## 2020-09-01 RX ADMIN — NEOSTIGMINE METHYLSULFATE 5 MG: 1 INJECTION INTRAMUSCULAR; INTRAVENOUS; SUBCUTANEOUS at 08:37

## 2020-09-01 RX ADMIN — GLYCOPYRROLATE 0.2 MG: 0.2 INJECTION INTRAMUSCULAR; INTRAVENOUS at 07:50

## 2020-09-01 RX ADMIN — EPHEDRINE SULFATE 10 MG: 50 INJECTION INTRAVENOUS at 08:50

## 2020-09-01 RX ADMIN — MUPIROCIN 1 APPLICATION: 20 OINTMENT TOPICAL at 21:02

## 2020-09-01 RX ADMIN — FENTANYL CITRATE 50 MCG: 50 INJECTION, SOLUTION INTRAMUSCULAR; INTRAVENOUS at 09:18

## 2020-09-01 RX ADMIN — ROPIVACAINE HYDROCHLORIDE 30 ML: 5 INJECTION, SOLUTION EPIDURAL; INFILTRATION; PERINEURAL at 06:31

## 2020-09-01 RX ADMIN — ONDANSETRON 4 MG: 2 INJECTION INTRAMUSCULAR; INTRAVENOUS at 12:49

## 2020-09-01 RX ADMIN — FENTANYL CITRATE 50 MCG: 50 INJECTION INTRAMUSCULAR; INTRAVENOUS at 08:01

## 2020-09-01 RX ADMIN — FENTANYL CITRATE 100 MCG: 50 INJECTION INTRAMUSCULAR; INTRAVENOUS at 07:09

## 2020-09-01 RX ADMIN — DEXAMETHASONE SODIUM PHOSPHATE 8 MG: 10 INJECTION INTRAMUSCULAR; INTRAVENOUS at 07:19

## 2020-09-01 RX ADMIN — CEFAZOLIN SODIUM 2 G: 2 INJECTION, SOLUTION INTRAVENOUS at 08:33

## 2020-09-01 RX ADMIN — FENTANYL CITRATE 50 MCG: 50 INJECTION, SOLUTION INTRAMUSCULAR; INTRAVENOUS at 06:47

## 2020-09-01 RX ADMIN — GLYCOPYRROLATE 0.4 MG: 0.2 INJECTION INTRAMUSCULAR; INTRAVENOUS at 08:37

## 2020-09-01 RX ADMIN — TRANEXAMIC ACID 1000 MG: 100 INJECTION, SOLUTION INTRAVENOUS at 07:19

## 2020-09-01 RX ADMIN — OXYCODONE HYDROCHLORIDE AND ACETAMINOPHEN 1 TABLET: 5; 325 TABLET ORAL at 17:19

## 2020-09-01 RX ADMIN — HYDROMORPHONE HYDROCHLORIDE 0.5 MG: 1 INJECTION, SOLUTION INTRAMUSCULAR; INTRAVENOUS; SUBCUTANEOUS at 09:26

## 2020-09-01 RX ADMIN — MEPIVACAINE HYDROCHLORIDE 10 ML: 15 INJECTION, SOLUTION EPIDURAL; INFILTRATION at 06:31

## 2020-09-01 RX ADMIN — MIDAZOLAM 2 MG: 1 INJECTION INTRAMUSCULAR; INTRAVENOUS at 06:46

## 2020-09-01 RX ADMIN — ACETAMINOPHEN 1000 MG: 500 TABLET ORAL at 06:15

## 2020-09-01 RX ADMIN — HYDROMORPHONE HYDROCHLORIDE 0.5 MG: 1 INJECTION, SOLUTION INTRAMUSCULAR; INTRAVENOUS; SUBCUTANEOUS at 10:04

## 2020-09-01 RX ADMIN — LISINOPRIL: 20 TABLET ORAL at 21:03

## 2020-09-01 RX ADMIN — FENTANYL CITRATE 50 MCG: 50 INJECTION INTRAMUSCULAR; INTRAVENOUS at 07:37

## 2020-09-01 RX ADMIN — ASPIRIN 325 MG: 325 TABLET, COATED ORAL at 17:19

## 2020-09-01 RX ADMIN — KETOROLAC TROMETHAMINE 15 MG: 30 INJECTION, SOLUTION INTRAMUSCULAR at 09:45

## 2020-09-01 RX ADMIN — ONDANSETRON HYDROCHLORIDE 4 MG: 2 SOLUTION INTRAMUSCULAR; INTRAVENOUS at 08:33

## 2020-09-01 RX ADMIN — FENTANYL CITRATE 50 MCG: 50 INJECTION, SOLUTION INTRAMUSCULAR; INTRAVENOUS at 09:46

## 2020-09-01 RX ADMIN — ATORVASTATIN CALCIUM 20 MG: 20 TABLET, FILM COATED ORAL at 21:03

## 2020-09-01 RX ADMIN — OXYCODONE HYDROCHLORIDE AND ACETAMINOPHEN 1 TABLET: 5; 325 TABLET ORAL at 21:03

## 2020-09-01 RX ADMIN — SODIUM CHLORIDE, POTASSIUM CHLORIDE, SODIUM LACTATE AND CALCIUM CHLORIDE: 600; 310; 30; 20 INJECTION, SOLUTION INTRAVENOUS at 08:03

## 2020-09-01 RX ADMIN — CLINDAMYCIN PHOSPHATE 900 MG: 900 INJECTION, SOLUTION INTRAVENOUS at 17:18

## 2020-09-01 RX ADMIN — SODIUM CHLORIDE, POTASSIUM CHLORIDE, SODIUM LACTATE AND CALCIUM CHLORIDE: 600; 310; 30; 20 INJECTION, SOLUTION INTRAVENOUS at 07:07

## 2020-09-01 RX ADMIN — ROCURONIUM BROMIDE 50 MG: 10 INJECTION INTRAVENOUS at 07:12

## 2020-09-01 RX ADMIN — HYDROMORPHONE HYDROCHLORIDE 0.5 MG: 2 INJECTION, SOLUTION INTRAMUSCULAR; INTRAVENOUS; SUBCUTANEOUS at 08:43

## 2020-09-01 RX ADMIN — CEFAZOLIN SODIUM 2 G: 2 INJECTION, SOLUTION INTRAVENOUS at 07:19

## 2020-09-01 RX ADMIN — FAMOTIDINE 20 MG: 10 INJECTION INTRAVENOUS at 06:32

## 2020-09-01 RX ADMIN — PROPOFOL 200 MG: 10 INJECTION, EMULSION INTRAVENOUS at 07:12

## 2020-09-01 NOTE — PLAN OF CARE
Problem: Patient Care Overview  Goal: Plan of Care Review  Outcome: Ongoing (interventions implemented as appropriate)  Flowsheets (Taken 9/1/2020 5268)  Progress: improving  Plan of Care Reviewed With: patient  Outcome Summary: INGRIDOX4, WORKED C PT, UP TO CHAIR, VSS, NVI, IVF, IV ABX, NAUSEA RESOLVED C ZOFRAN DOSE, PAIN CONTROLLED C PO MEDS, I&O CATH AT 1530 C 900CC OUTPUT

## 2020-09-01 NOTE — PLAN OF CARE
Problem: Patient Care Overview  Goal: Plan of Care Review  Flowsheets (Taken 9/1/2020 2546)  Plan of Care Reviewed With: patient  Outcome Summary: Pt. presents with typical post op impairments related to TKR surgery that include decreased ROM, decreased strength, and decreased balance.  Pt. will benefit from skilled inpt. P.T. to address his functional deficits and to assist pt. in regaining his maximum level of independence with functional mobility.   Patient was wearing a face mask during this therapy encounter. Therapist used appropriate personal protective equipment including eye protection, mask, and gloves.  Mask used was standard procedure mask. Appropriate PPE was worn during the entire therapy session. Hand hygiene was completed before and after therapy session. Patient is not in enhanced droplet precautions.

## 2020-09-01 NOTE — H&P
Orthopaedic Surgery History and Physical    Patient Name:  Paolo Calvo  YOB: 1950  Age: 70 y.o.  Medical Records Number:  [id number]    Date of Admission:  9/1/2020  5:28 AM    Chief Complaint:  Primary osteoarthritis of right knee [M17.11]    Paolo Calvo is a 70 y.o. male who presents c/o severe right knee pain.  The pain has been on and off for many years, worsening to the point where the pain is becoming disabling.  The pain is a constant dull ache with occasional sharp, stabbing pain.  The patient has failed conservative treatment and would like to proceed with right total knee arthroplasty.    /98 (BP Location: Right arm, Patient Position: Lying)   Pulse 92   Temp 98.8 °F (37.1 °C) (Oral)   Resp 16   SpO2 98%     Past Medical History:    Past Medical History:   Diagnosis Date   • Arthritis    • Diabetes mellitus (CMS/HCC)     DIET CONTROLLED   • Disease of thyroid gland    • GERD (gastroesophageal reflux disease)    • Hyperlipidemia    • Hypertension    • Psoriasis    • Right knee pain    • Seasonal allergies    • Sleep apnea     MACHINE       Past Surgical History:   Past Surgical History:   Procedure Laterality Date   • ADENOIDECTOMY     • COLONOSCOPY     • EXTRACORPOREAL SHOCK WAVE LITHOTRIPSY (ESWL)     • SEPTOPLASTY     • TONSILLECTOMY         Social History:    Social History     Socioeconomic History   • Marital status:      Spouse name: Not on file   • Number of children: Not on file   • Years of education: Not on file   • Highest education level: Not on file   Tobacco Use   • Smoking status: Former Smoker     Years: 1.00     Types: Cigarettes   • Smokeless tobacco: Never Used   • Tobacco comment: QUIT AGE 22   Substance and Sexual Activity   • Alcohol use: Yes     Comment: OCCASIONALLY   • Drug use: Not Currently       Family History:    Family History   Problem Relation Age of Onset   • Malig Hyperthermia Neg Hx        Current Medications:  Scheduled  Meds:  ceFAZolin 2 g Intravenous Once   orthopedic surgery cocktail 1 (BH CLEM)  Injection Once   sodium chloride 3 mL Intravenous Q12H     Continuous Infusions:  lactated ringers 9 mL/hr     PRN Meds:.fentanyl  •  lidocaine PF 1%  •  midazolam  •  sodium chloride    Current Facility-Administered Medications:   •  ceFAZolin in dextrose (ANCEF) IVPB solution 2 g, 2 g, Intravenous, Once, Zafar Christensen MD  •  fentaNYL citrate (PF) (SUBLIMAZE) injection 50 mcg, 50 mcg, Intravenous, Q10 Min PRN, Thomas Huerta MD, 50 mcg at 09/01/20 0647  •  lactated ringers infusion, 9 mL/hr, Intravenous, Continuous, Thomas Huerta MD  •  lidocaine PF 1% (XYLOCAINE) injection 0.5 mL, 0.5 mL, Injection, Once PRN, Thomas Huerta MD  •  midazolam (VERSED) injection 1 mg, 1 mg, Intravenous, Q10 Min PRN, Thomas Huerta MD, 2 mg at 09/01/20 0646  •  ropivacaine (NAROPIN) 0.5 % 50 mL, Morphine (PF) 5 mg, ketorolac (TORADOL) 30 mg, EPINEPHrine (ADRENALIN) 30 MG/30ML 0.3 mg in sodium chloride 0.9 % 101.8 mL, , Injection, Once, Zafar Christensen MD  •  sodium chloride 0.9 % flush 3 mL, 3 mL, Intravenous, Q12H, Thomas Huerta MD  •  sodium chloride 0.9 % flush 3-10 mL, 3-10 mL, Intravenous, PRN, Thomas Huerta MD    Allergies:    Allergies   Allergen Reactions   • Sulfa Antibiotics Other (See Comments)     Patient is un aware of reaction, He was 4 years old       Review of Systems:   HEENT: Patient denies any headaches, vision changes, change in hearing, or tinnitus, Patient denies any rhinorrhea,epistaxis, sinus pain, mouth or dental problems, sore throat or hoarseness, or dysphagia  Pulmonary: Patient denies any cough, congestion, SOA, or wheezing  Cardiovascular: Patient denies any chest pain, dyspnea, palpitations, weakness, intolerance of exercise, varicosities, swelling of extremities, known murmur  Gastrointestinal:  Patient denies nausea, vomiting, diarrhea, constipation, loss  of  appetite, change in appetite, dysphagia, gas, heartburn, melena, change in bowel habits, use of laxatives or other drugs to alter the function of the gastrointestinal tract.  Genital/Urinary: Patient denies dysuria, change in color of urine, change in frequency of urination, pain with urgency, incontinence, retention, or nocturia.  Musculoskeletal: Patient denies increased warmth; redness; or swelling of joints; limitation of function; deformity; crepitation: pain in a joint or an extremity, the neck, or the back, especially with movement.  Neurological: Patient denies dizziness, tremor, ataxia, difficulty in speaking, change in speech, paresthesia, loss of sensation, seizures, syncope, changes in memory.  Endocrine system: Patient denies tremors, palpitations, intolerance of heat or cold, polyuria, polydipsia, polyphagia, diaphoresis, exophthalmos, or goiter.  Psychological: Patient denies thoughts/plans or harming self or other; depression,  insomnia, night terrors, rito, memory loss, disorientation.  Skin: Patient denies any bruising, rashes, discoloration, pruritus, wounds, ulcers, decubiti, changes in the hair or nails  Hematopoietic: Patient denies history of spontaneous or excessive bleeding, epistaxis, hematuria, melena, fatigue, enlarged or tender lymph nodes, pallor, history of anemia.      Physical Exam:   Awake, A&O x3, affect normal, no acute distress  Ambulating with a limp due to knee pain  Knee ROM is limited due to pain (5-115)  Strength is 4/5 in the quad, hamstring and calf  Cap refill is normal, Sensation intact    Card:  RR, HD Stable  Pulm:  Regular breathing, no S.O.A  Abd:  Soft, NT, ND    Lab Results (last 24 hours)     Procedure Component Value Units Date/Time    POC Glucose Once [[id number]]  (Normal) Collected:  09/01/20 0606    Specimen:  Blood Updated:  09/01/20 0608     Glucose 105 mg/dL     SCANNED - LABS [[id number]] Resulted:  09/01/20      Updated:  08/31/20 1004          Xr Knee 1  Or 2 View Right    Result Date: 8/18/2020  Narrative: XR CHEST PA AND LATERAL-, XR KNEE 1 OR 2 VW RIGHT-  CLINICAL INFORMATION: Preop right knee arthroplasty.  COMPARISON: None.  CHEST FINDINGS: Cardiac size within normal limits. No mediastinal or hilar abnormality. No effusion, edema or consolidation.  CONCLUSION: No active disease of the chest.  RIGHT KNEE FINDINGS: There is medial compartment and patellofemoral joint space loss. There is patellar spur formation. No bone lesion, fracture or dislocation nor joint effusion.  CONCLUSION: Substantial degenerative change involving the right knee.  This report was finalized on 8/18/2020 1:26 PM by Dr. Suhail Magana M.D.      Xr Chest Pa & Lateral    Result Date: 8/18/2020  Narrative: XR CHEST PA AND LATERAL-, XR KNEE 1 OR 2 VW RIGHT-  CLINICAL INFORMATION: Preop right knee arthroplasty.  COMPARISON: None.  CHEST FINDINGS: Cardiac size within normal limits. No mediastinal or hilar abnormality. No effusion, edema or consolidation.  CONCLUSION: No active disease of the chest.  RIGHT KNEE FINDINGS: There is medial compartment and patellofemoral joint space loss. There is patellar spur formation. No bone lesion, fracture or dislocation nor joint effusion.  CONCLUSION: Substantial degenerative change involving the right knee.  This report was finalized on 8/18/2020 1:26 PM by Dr. Suhail Magana M.D.        Assessment:  End-stage Primary Right Knee Osteoarthritis    Plan:  Patient's pain is becoming disabling, despite extensive conservative treatment.  Radiographs reveal end-stage degenerative changes.  The risks of surgery, including, but not limited to, heart attack, stroke, dying, DVT, nerve injury, vascular injury, arthrofibrosis and infection were discussed.  The alternatives and benefits were also discussed.  All questions answered and the patient wishes to proceed with right total knee arthroplasty.    Zafar Sebastian PA-C  [facility]  [place] [id number]  [phone]    9/1/2020    CC: Kerry Mcclure MD, Zafar Christensen MD

## 2020-09-01 NOTE — OP NOTE
Orthopaedic Surgery Operative Note    Patient Name:  Paolo Calvo  YOB: 1950  Age: 70 y.o.  Medical Records Number:  [id number]    Date of Procedure:  9/1/2020    Pre-operative Diagnosis:  Primary Osteoarthritis Right Knee    Post-operative Diagnosis:  Primary Osteoarthritis Right Knee    Procedure Performed:  Right Total Knee Arthroplasty    Implants:  Biomet Vanguard TKA, 65 Femoral Component, 71 Tibial Tray with a 40 mm Modular Stem, 31 3-Peg Patella, 12 Posterior Lipped Polyethylene Insert    Surgeon:  Zafar Christensen M.D.    Assistant: Patricia Toro (who was present during the critical portions of the case, thereby decreasing operative time and patient morbidity)    Anesthetic Type:  General    Estimated Blood Loss:  250cc's    Specimens: * No orders in the log *    No Complications      Indications for Procedure:  Paolo Calvo is a 70 y.o. male suffering from end stage degenerative changes in the right knee.  The patients pain is becoming disabling, despite extensive conservative care, including NSAIDS, therapy and injections.  The risks, benefits and alternatives were discussed and the patient wishes to proceed with right total knee arthroplasty.      Procedure Performed:    After informed consent was obtained, the correct patient identified, the correct operative side marked by the operative surgeon and pre-operative IV Kefzol given (prior to tourniquet inflation), the patient was taken to the operating room and placed supine on the operating table.  After general anesthesia was induced, a surgical time out was performed and 1 gm of Tranxemic Acid given, a well padded tourniquet was placed and the patient's right lower extremity was prepped with ChloraPrep and draped in a sterile fashion.    A midline incision was made overlying the right knee and we sharply dissected down to expose the parapatellar retinaculum.  A muscle sparing, mid-vastus parapatellar arthrotomy was performed and we  elevated the soft tissue both medially and laterally.  The menisci and ACL were excised.  We everted the patella and measured this to be 22 mm and we removed 7 mm of bone down to 15 mm.  We measured the patella to be 31 and drilled our three drill holes.  We protected the patella with the patella protector and turned our attention to the femur and the tibia.    We drilled drill holes into the femoral and the tibial intramedullary canals and proceeded to irrigate the canals to remove the fatty marrow.  We used the intramedullary david and the 5 degree valgus cut block to make our distal femoral cut.  Once we had a smooth surface, we measured the femur to be 65.  We assured we had rotational alignment using the epicondylar axis, then we used the four-in-one cut block to make our anterior, posterior, anterior and posterior chamfer cuts.  We placed our femoral trial, had excellent fit, and extended the knee and marked Savita's line on the tibia.      We then turned our attention to the tibia, where we irrigated the canal again.  We used the intramedullary david and the 3 degree posterior sloped cut block to remove 2 mm of bone from the effected side of the tibia.  Prior to making our cut, we assured we had rotational alignment using the external guide and Savita's line.  Once we had a smooth surface, we measured the tibia to be 71.  We then removed soft tissue and bony debris from the posterior aspect of the knee.    We placed our trial implants and had excellent fit, range of motion, stability and ligament balance, throughout a complete arc of motion with a 12 lipped polyethylene liner.  We removed our trial implants, punched the tibial keel, copiously irrigated the knee, then cemented our implants in place using Biomet cement.  Once the cement cured, we trialed again with the 10, 12 and 14 AS,standard and posterior lipped polyethylene liners.  The 12 lipped gave us the best range of motion, stability and ligament  "balance, throughout a complete arc of motion.  We removed the trials, copiously irrigated the knee, gave IV antibiotics and local anesthetic, then placed our permanent polyethylene liner.  We placed a 1/8\" hemovac drain, then closed the arthrotomy with #1 Vicryl pop-off sutures.  The subcutaneous tissue was closed with 2-0 vicryl pop-off sutures.  The skin was closed with staples.  We placed a sterile dressing of Xeroform, 4x4's, abdominal pads, cast padding and an Ace Wrap.  All sponge and needle counts were correct.  The patient was then awakened from general anesthesia and taken to the recovery room in stable condition.    The patient will be started on Aspirin 325 mg twice daily for DVT prophylaxis.  IV antibiotics will be discontinued within 24 hours of surgery.  Immediately prior to surgery, there were no acute Thromboembolic nor Cardiovascular risk factors.  An updated Medical Reconciliation form is on the chart.    Zafar Sebastian PA-C  [facility]  [address] [place] [address]  [phone]    9/1/2020  "

## 2020-09-01 NOTE — THERAPY EVALUATION
Patient Name: Paolo Calvo  : 1950    MRN: [id number]                              Today's Date: 2020       Admit Date: 2020    Visit Dx:     ICD-10-CM ICD-9-CM   1. Primary osteoarthritis of right knee M17.11 715.16     Patient Active Problem List   Diagnosis   • Primary osteoarthritis of right knee     Past Medical History:   Diagnosis Date   • Arthritis    • Diabetes mellitus (CMS/HCC)     DIET CONTROLLED   • Disease of thyroid gland    • GERD (gastroesophageal reflux disease)    • Hyperlipidemia    • Hypertension    • Psoriasis    • Right knee pain    • Seasonal allergies    • Sleep apnea     MACHINE     Past Surgical History:   Procedure Laterality Date   • ADENOIDECTOMY     • COLONOSCOPY     • EXTRACORPOREAL SHOCK WAVE LITHOTRIPSY (ESWL)     • SEPTOPLASTY     • TONSILLECTOMY       General Information     Row Name 20 1551          PT Evaluation Time/Intention    Document Type  evaluation Pt is s/p Right TKR  -MS     Mode of Treatment  physical therapy;individual therapy  -MS     Row Name 20 4115          General Information    Patient Profile Reviewed?  yes  -MS     Prior Level of Function  independent:  -MS     Existing Precautions/Restrictions  (S) fall Exit alarm   -MS     Barriers to Rehab  none identified  -MS     Row Name 20 1554          Cognitive Assessment/Intervention- PT/OT    Orientation Status (Cognition)  oriented x 3  -MS     Row Name 20 5852          Safety Issues, Functional Mobility    Comment, Safety Issues/Impairments (Mobility)  Gait belt used for safety.   -MS       User Key  (r) = Recorded By, (t) = Taken By, (c) = Cosigned By    Initials Name Provider Type    MS Marito Nieto, PT Physical Therapist        Mobility     Row Name 20 7096          Bed Mobility Assessment/Treatment    Bed Mobility Assessment/Treatment  supine-sit  -MS     Supine-Sit [place] (Bed Mobility)  contact guard  -MS     Row Name 20 3192          Sit-Stand  Transfer    Sit-Stand [place] (Transfers)  contact guard  -MS     Assistive Device (Sit-Stand Transfers)  walker, front-wheeled  -MS     Row Name 09/01/20 1553          Gait/Stairs Assessment/Training    [place] Level (Gait)  minimum assist (75% patient effort);2 person assist  -MS     Assistive Device (Gait)  walker, front-wheeled  -MS     Distance in Feet (Gait)  20 feet  -MS     Pattern (Gait)  step-to  -MS     Deviations/Abnormal Patterns (Gait)  antalgic;sheree decreased  -MS     Bilateral Gait Deviations  knee buckling, right side  -MS     Comment (Gait/Stairs)  Pt. had to maintain TTWB RLE due to knee buckling.  Verbal/tactile cues for posture correction.   -MS     Row Name 09/01/20 1553          Mobility Assessment/Intervention    Extremity Weight-bearing Status  right lower extremity  -MS     Right Lower Extremity (Weight-bearing Status)  weight-bearing as tolerated (WBAT)  -MS       User Key  (r) = Recorded By, (t) = Taken By, (c) = Cosigned By    Initials Name Provider Type    Marito Dunne, PT Physical Therapist        Obj/Interventions     Row Name 09/01/20 1554          General ROM    GENERAL ROM COMMENTS  BUE/LLE (WFL's)  -MS     Row Name 09/01/20 1554          MMT (Manual Muscle Testing)    General MMT Comments  BUE/LLE (3+/5)   -MS     Row Name 09/01/20 1554          Therapeutic Exercise    Comment (Therapeutic Exercise)  Right TKR ther. ex. program x 10 reps completed  -MS       User Key  (r) = Recorded By, (t) = Taken By, (c) = Cosigned By    Initials Name Provider Type    Marito Dunne, PT Physical Therapist        Goals/Plan     Row Name 09/01/20 1556          Bed Mobility Goal 1 (PT)    Activity/Assistive Device (Bed Mobility Goal 1, PT)  bed mobility activities, all  -MS     [place] Level/Cues Needed (Bed Mobility Goal 1, PT)  independent  -MS     Time Frame (Bed Mobility Goal 1, PT)  long term goal (LTG);3 days  -MS     Row Name 09/01/20 1556          Transfer  Goal 1 (PT)    Activity/Assistive Device (Transfer Goal 1, PT)  transfers, all;walker, rolling  -MS     [place] Level/Cues Needed (Transfer Goal 1, PT)  independent  -MS     Time Frame (Transfer Goal 1, PT)  long term goal (LTG);2 - 3 days  -MS     Row Name 09/01/20 1556          Gait Training Goal 1 (PT)    Activity/Assistive Device (Gait Training Goal 1, PT)  gait (walking locomotion);walker, rolling  -MS     [place] Level (Gait Training Goal 1, PT)  independent  -MS     Distance (Gait Goal 1, PT)  100 feet  -MS     Time Frame (Gait Training Goal 1, PT)  long term goal (LTG);2 - 3 days  -MS     Row Name 09/01/20 1556          ROM Goal 1 (PT)    ROM Goal 1 (PT)  Right knee AROM (5, 85)   -MS     Time Frame (ROM Goal 1, PT)  long term goal (LTG);2 - 3 days  -MS     [facility] Name 09/01/20 1556          Stairs Goal 1 (PT)    Activity/Assistive Device (Stairs Goal 1, PT)  stairs, all skills  -MS     [place] Level/Cues Needed (Stairs Goal 1, PT)  contact guard assist  -MS     Number of Stairs (Stairs Goal 1, PT)  3  -MS     Time Frame (Stairs Goal 1, PT)  long term goal (LTG);3 days  -MS       User Key  (r) = Recorded By, (t) = Taken By, (c) = Cosigned By    Initials Name Provider Type    Marito Dunne L, PT Physical Therapist        Clinical Impression     [facility] Name 09/01/20 1555          Pain Assessment    Additional Documentation  Pain Scale: Numbers Pre/Post-Treatment (Group)  -MS     [facility] Name 09/01/20 1555          Pain Scale: Numbers Pre/Post-Treatment    Pain Scale: Numbers, Pretreatment  5/10  -MS     Pain Scale: Numbers, Post-Treatment  5/10  -MS     Pain Location - Side  Right  -MS     Pain Location  knee  -MS     Pain Intervention(s)  Medication (See MAR);Elevated;Rest;Cold pack;Repositioned  -MS     Row Name 09/01/20 1555          Plan of Care Review    Plan of Care Reviewed With  patient  -MS     [facility] Name 09/01/20 1557          Physical Therapy Clinical Impression    Criteria for Skilled  Interventions Met (PT Clinical Impression)  treatment indicated  -MS     Rehab Potential (PT Clinical Summary)  good, to achieve stated therapy goals  -MS     Row Name 09/01/20 1555          Positioning and Restraints    Pre-Treatment Position  in bed  -MS     Post Treatment Position  chair  -MS     In Chair  notified nsg;reclined;sitting;call light within reach;encouraged to call for assist;exit alarm on All lines intact. Ice pack to Right knee.   -MS       User Key  (r) = Recorded By, (t) = Taken By, (c) = Cosigned By    Initials Name Provider Type    Marito Dunne, PT Physical Therapist        Outcome Measures     Row Name 09/01/20 1556          How much help from another person do you currently need...    Turning from your back to your side while in flat bed without using bedrails?  3  -MS     Moving from lying on back to sitting on the side of a flat bed without bedrails?  3  -MS     Moving to and from a bed to a chair (including a wheelchair)?  3  -MS     Standing up from a chair using your arms (e.g., wheelchair, bedside chair)?  3  -MS     Climbing 3-5 steps with a railing?  2  -MS     To walk in hospital room?  3  -MS     AM-PAC 6 Clicks Score (PT)  17  -MS     Row Name 09/01/20 1556          Functional Assessment    Outcome Measure Options  AM-PAC 6 Clicks Basic Mobility (PT)  -MS       User Key  (r) = Recorded By, (t) = Taken By, (c) = Cosigned By    Initials Name Provider Type    Marito Dunne PT Physical Therapist        Physical Therapy Education                 Title: PT OT SLP Therapies (Done)     Topic: Physical Therapy (Done)     Point: Mobility training (Done)     Description:   Instruct learner(s) on safety and technique for assisting patient out of bed, chair or wheelchair.  Instruct in the proper use of assistive devices, such as walker, crutches, cane or brace.              Patient Friendly Description:   It's important to get you on your feet again, but we need to do so in a  way that is safe for you. Falling has serious consequences, and your personal safety is the most important thing of all.        When it's time to get out of bed, one of us or a family member will sit next to you on the bed to give you support.     If your doctor or nurse tells you to use a walker, crutches, a cane, or a brace, be sure you use it every time you get out of bed, even if you think you don't need it.    Learning Progress Summary           Patient Acceptance, E,D, VU,NR by MS at 9/1/2020 1557                   Point: Home exercise program (Done)     Description:   Instruct learner(s) on appropriate technique for monitoring, assisting and/or progressing patient with therapeutic exercises and activities.              Learning Progress Summary           Patient Acceptance, E,D, VU,NR by MS at 9/1/2020 1557                   Point: Body mechanics (Done)     Description:   Instruct learner(s) on proper positioning and spine alignment for patient and/or caregiver during mobility tasks and/or exercises.              Learning Progress Summary           Patient Acceptance, E,D, VU,NR by MS at 9/1/2020 1557                   Point: Precautions (Done)     Description:   Instruct learner(s) on prescribed precautions during mobility and gait tasks              Learning Progress Summary           Patient Acceptance, E,D, VU,NR by MS at 9/1/2020 1557                               User Key     Initials Effective Dates Name Provider Type Discipline    MS 04/03/18 -  Marito Nieto, PT Physical Therapist PT              PT Recommendation and Plan  Planned Therapy Interventions (PT Eval): balance training, bed mobility training, gait training, home exercise program, patient/family education, postural re-education, strengthening, ROM (range of motion), stair training, transfer training  Outcome Summary/Treatment Plan (PT)  Anticipated Equipment Needs at Discharge (PT): (Ordered pt. a Rwx for home use. )  Anticipated  Discharge Disposition (PT): home with assist, home with home health  Plan of Care Reviewed With: patient  Outcome Summary: Pt. presents with typical post op impairments related to TKR surgery that include decreased ROM, decreased strength, and decreased balance.  Pt. will benefit from skilled inpt. P.T. to address his functional deficits and to assist pt. in regaining his maximum level of independence with functional mobility.     Time Calculation:   PT Charges     Row Name 09/01/20 1558             Time Calculation    Start Time  1422  -MS      Stop Time  1438  -MS      Time Calculation (min)  16 min  -MS      PT Received On  09/01/20  -MS      PT - Next Appointment  09/02/20  -MS      PT Goal Re-Cert Due Date  09/03/20  -MS         Time Calculation- PT    Total Timed Code Minutes- PT  14 minute(s)  -MS        User Key  (r) = Recorded By, (t) = Taken By, (c) = Cosigned By    Initials Name Provider Type    Marito Dunne, PT Physical Therapist        Therapy Charges for Today     Code Description Service Date Service Provider Modifiers Qty    20721445134 HC PT EVAL LOW COMPLEXITY 1 9/1/2020 Marito Nieto, PT GP 1    43977753191 HC PT THER PROC EA 15 MIN 9/1/2020 Marito Nieto, PT GP 1    21255214107 HC PT THER SUPP EA 15 MIN 9/1/2020 Marito Nieto, PT GP 1          PT G-Codes  Outcome Measure Options: AM-PAC 6 Clicks Basic Mobility (PT)  AM-PAC 6 Clicks Score (PT): 17    Marito Nieto PT  9/1/2020

## 2020-09-01 NOTE — ANESTHESIA POSTPROCEDURE EVALUATION
Patient: Paolo Calvo    Procedure Summary     Date:  09/01/20 Room / Location:  [facility] OR [address] [place] [address] MAIN OR    Anesthesia Start:  0707 Anesthesia Stop:  0904    Procedure:  RIGHT TOTAL KNEE ARTHROPLASTY (Right Knee) Diagnosis:      Surgeon:  Zafar Christensen MD Provider:  Thomas Huerta MD    Anesthesia Type:  general with block ASA Status:  3          Anesthesia Type: general with block    Vitals  Vitals Value Taken Time   /84 9/1/2020  9:20 AM   Temp 36.6 °C (97.8 °F) 9/1/2020  8:58 AM   Pulse 89 9/1/2020  9:24 AM   Resp 16 9/1/2020  9:15 AM   SpO2 96 % 9/1/2020  9:24 AM   Vitals shown include unvalidated device data.        Post Anesthesia Care and Evaluation    Patient location during evaluation: PACU  Patient participation: complete - patient participated  Level of consciousness: awake and alert  Pain management: adequate  Airway patency: patent  Anesthetic complications: No anesthetic complications    Cardiovascular status: acceptable  Respiratory status: acceptable  Hydration status: acceptable    Comments: --------------------            09/01/20 0915     --------------------   BP:       153/95     Pulse:      86       Resp:       16       Temp:                SpO2:      98%      --------------------

## 2020-09-01 NOTE — ANESTHESIA PROCEDURE NOTES
Airway  Urgency: elective    Date/Time: 9/1/2020 7:17 AM  Airway not difficult    General Information and Staff    Patient location during procedure: OR  Anesthesiologist: Thomas Huerta MD  CRNA: Romina Chapin CRNA    Indications and Patient Condition  Indications for airway management: airway protection    Preoxygenated: yes  MILS maintained throughout  Mask difficulty assessment: 2 - vent by mask + OA or adjuvant +/- NMBA    Final Airway Details  Final airway type: endotracheal airway      Successful airway: ETT  Cuffed: yes   Successful intubation technique: video laryngoscopy  Facilitating devices/methods: cricoid pressure  Endotracheal tube insertion site: oral  Blade: CMAC  Blade size: D  ETT size (mm): 8.0  Cormack-Lehane Classification: grade I - full view of glottis  Placement verified by: chest auscultation and capnometry   Cuff volume (mL): 10  Measured from: teeth  ETT/EBT  to teeth (cm): 22  Number of attempts at approach: 1  Assessment: lips, teeth, and gum same as pre-op and atraumatic intubation    Additional Comments  [facility] utilized first attempt R/T assessment of difficult AW.

## 2020-09-01 NOTE — ANESTHESIA PREPROCEDURE EVALUATION
Anesthesia Evaluation     Patient summary reviewed and Nursing notes reviewed                Airway   Mallampati: III  Neck ROM: limited  Dental      Pulmonary    (+) a smoker Former, sleep apnea on CPAP,   Cardiovascular     ECG reviewed  Rhythm: regular  Rate: normal    (+) hypertension, hyperlipidemia,       Neuro/Psych- negative ROS  GI/Hepatic/Renal/Endo    (+)  GERD,  diabetes mellitus,     Musculoskeletal     Abdominal    Substance History - negative use     OB/GYN negative ob/gyn ROS         Other   arthritis,                      Anesthesia Plan    ASA 3     general with block   (Right ACBx    CPAP at home    CMAC available for intubation if GETA chosen over LMA)  intravenous induction     Anesthetic plan, all risks, benefits, and alternatives have been provided, discussed and informed consent has been obtained with: patient.

## 2020-09-01 NOTE — DISCHARGE PLACEMENT REQUEST
"Paolo Calvo (70 y.o. Male)     Date of Birth Social Security Number Address Home Phone MRN    1950  [address] [place] [address] [phone] [id number]    Holiness Marital Status          None        Admission Date Admission Type Admitting Provider Attending Provider Department, Room/Bed    9/1/20 Elective Zafar Christensen MD Goodin, Robert A, MD [address], P782/1    Discharge Date Discharge Disposition Discharge Destination                       Attending Provider:  Zafar Christensen MD    Allergies:  Sulfa Antibiotics    Isolation:  None   Infection:  None   Code Status:  CPR    Ht:  165.1 cm (65\")   Wt:  87.5 kg (193 lb)    Admission Cmt:  None   Principal Problem:  None                Active Insurance as of 9/1/2020     Primary Coverage     Payor Plan Insurance Group Employer/Plan Group    [employer] BLUE CROSS [employer] EMPLOYEE [id number]     Payor Plan Address Payor Plan Phone Number Payor Plan Fax Number Effective Dates    [address] [phone]  9/1/2018 - None Entered    [facility] [address]       Subscriber Name Subscriber Birth Date Member ID       MONTANA CALVO [birth year]1960 [id number]           Secondary Coverage     Payor Plan Insurance Group Employer/Plan Group    MEDICARE MEDICARE A ONLY      Payor Plan Address Payor Plan Phone Number Payor Plan Fax Number Effective Dates    [address] [phone]  7/1/2017 - None Entered    [facility] [address]       Subscriber Name Subscriber Birth Date Member ID       PAOLO CALVO 1950 [id number]                 Emergency Contacts      (Rel.) Home Phone Work Phone Mobile Phone    BON CALVO (Spouse) [phone] -- [phone]        "

## 2020-09-02 LAB
ANION GAP SERPL CALCULATED.3IONS-SCNC: 9 MMOL/L (ref 5–15)
BUN SERPL-MCNC: 19 MG/DL (ref 8–23)
BUN/CREAT SERPL: 16.7 (ref 7–25)
CALCIUM SPEC-SCNC: 8.9 MG/DL (ref 8.6–10.5)
CHLORIDE SERPL-SCNC: 99 MMOL/L (ref 98–107)
CO2 SERPL-SCNC: 28 MMOL/L (ref 22–29)
CREAT SERPL-MCNC: 1.14 MG/DL (ref 0.76–1.27)
DEPRECATED RDW RBC AUTO: 45.1 FL (ref 37–54)
ERYTHROCYTE [DISTWIDTH] IN BLOOD BY AUTOMATED COUNT: 13.3 % (ref 12.3–15.4)
GFR SERPL CREATININE-BSD FRML MDRD: 64 ML/MIN/1.73
GLUCOSE SERPL-MCNC: 134 MG/DL (ref 65–99)
HCT VFR BLD AUTO: 35.1 % (ref 37.5–51)
HGB BLD-MCNC: 11.5 G/DL (ref 13–17.7)
MCH RBC QN AUTO: 30.5 PG (ref 26.6–33)
MCHC RBC AUTO-ENTMCNC: 32.8 G/DL (ref 31.5–35.7)
MCV RBC AUTO: 93.1 FL (ref 79–97)
PLATELET # BLD AUTO: 220 10*3/MM3 (ref 140–450)
PMV BLD AUTO: 9.9 FL (ref 6–12)
POTASSIUM SERPL-SCNC: 4.1 MMOL/L (ref 3.5–5.2)
RBC # BLD AUTO: 3.77 10*6/MM3 (ref 4.14–5.8)
SODIUM SERPL-SCNC: 136 MMOL/L (ref 136–145)
WBC # BLD AUTO: 11.84 10*3/MM3 (ref 3.4–10.8)

## 2020-09-02 PROCEDURE — 97530 THERAPEUTIC ACTIVITIES: CPT

## 2020-09-02 PROCEDURE — 80048 BASIC METABOLIC PNL TOTAL CA: CPT | Performed by: ORTHOPAEDIC SURGERY

## 2020-09-02 PROCEDURE — 97162 PT EVAL MOD COMPLEX 30 MIN: CPT

## 2020-09-02 PROCEDURE — 25010000002 ONDANSETRON PER 1 MG: Performed by: ORTHOPAEDIC SURGERY

## 2020-09-02 PROCEDURE — 85027 COMPLETE CBC AUTOMATED: CPT | Performed by: ORTHOPAEDIC SURGERY

## 2020-09-02 RX ORDER — TAMSULOSIN HYDROCHLORIDE 0.4 MG/1
0.4 CAPSULE ORAL DAILY
Status: DISCONTINUED | OUTPATIENT
Start: 2020-09-02 | End: 2020-09-03 | Stop reason: SDUPTHER

## 2020-09-02 RX ORDER — PSEUDOEPHEDRINE HCL 30 MG
100 TABLET ORAL 2 TIMES DAILY PRN
Qty: 40 EACH | Refills: 1 | Status: SHIPPED | OUTPATIENT
Start: 2020-09-02

## 2020-09-02 RX ADMIN — OXYCODONE HYDROCHLORIDE AND ACETAMINOPHEN 1 TABLET: 5; 325 TABLET ORAL at 22:16

## 2020-09-02 RX ADMIN — TAMSULOSIN HYDROCHLORIDE 0.4 MG: 0.4 CAPSULE ORAL at 18:04

## 2020-09-02 RX ADMIN — SODIUM CHLORIDE, PRESERVATIVE FREE 3 ML: 5 INJECTION INTRAVENOUS at 21:06

## 2020-09-02 RX ADMIN — OXYCODONE HYDROCHLORIDE AND ACETAMINOPHEN 2 TABLET: 5; 325 TABLET ORAL at 18:04

## 2020-09-02 RX ADMIN — LISINOPRIL: 20 TABLET ORAL at 21:06

## 2020-09-02 RX ADMIN — ONDANSETRON 4 MG: 2 INJECTION INTRAMUSCULAR; INTRAVENOUS at 16:30

## 2020-09-02 RX ADMIN — PANTOPRAZOLE SODIUM 40 MG: 40 TABLET, DELAYED RELEASE ORAL at 09:00

## 2020-09-02 RX ADMIN — FERROUS SULFATE TAB 325 MG (65 MG ELEMENTAL FE) 325 MG: 325 (65 FE) TAB at 09:00

## 2020-09-02 RX ADMIN — DOCUSATE SODIUM 100 MG: 100 CAPSULE, LIQUID FILLED ORAL at 09:04

## 2020-09-02 RX ADMIN — CLINDAMYCIN PHOSPHATE 900 MG: 900 INJECTION, SOLUTION INTRAVENOUS at 00:24

## 2020-09-02 RX ADMIN — OXYCODONE HYDROCHLORIDE AND ACETAMINOPHEN 1 TABLET: 5; 325 TABLET ORAL at 13:40

## 2020-09-02 RX ADMIN — ASPIRIN 325 MG: 325 TABLET, COATED ORAL at 09:00

## 2020-09-02 RX ADMIN — ATORVASTATIN CALCIUM 20 MG: 20 TABLET, FILM COATED ORAL at 21:06

## 2020-09-02 RX ADMIN — MUPIROCIN 1 APPLICATION: 20 OINTMENT TOPICAL at 21:06

## 2020-09-02 RX ADMIN — OXYCODONE HYDROCHLORIDE AND ACETAMINOPHEN 1 TABLET: 5; 325 TABLET ORAL at 00:51

## 2020-09-02 RX ADMIN — MUPIROCIN 1 APPLICATION: 20 OINTMENT TOPICAL at 09:00

## 2020-09-02 RX ADMIN — OXYCODONE HYDROCHLORIDE AND ACETAMINOPHEN 1 TABLET: 5; 325 TABLET ORAL at 09:00

## 2020-09-02 RX ADMIN — ASPIRIN 325 MG: 325 TABLET, COATED ORAL at 18:04

## 2020-09-02 NOTE — DISCHARGE INSTR - ACTIVITY
Total Knee Joint Replacement Discharge Instructions:     I. ACTIVITIES:  1. Exercises:  · Complete exercise program as taught by the hospital physical therapist 2 times per day  · Exercise program will be advanced by the physical therapist  · During the day be up ambulating every 2 hours (while awake) for short distances  · Complete the ankle pump exercises at least 10 times per hour (while awake)  · Elevate legs most of the day the first week post operatively and thereafter elevate legs when in bed and for at least 30 minutes during the day. Caution must be taken to avoid pillow placement under the bend of the knee as this can led to flexion contractures of the knee.  · Use cold packs 20-30 minutes approximately 5 times per day. This should be done before and after completing your exercises and at any time you are experiencing pain/ stiffness in your operative extremity.        2. Activities of Daily Living:  · No tub baths, hot tubs, or swimming pools for 4 weeks  · May shower and let water run over the incision on post-operative day #5 if no drainage. Do not scrub or rub the incision. Simply let the water run over the incision and pat dry.     II. Restrictions  · Do not cross legs or kneel  · Your surgeon will discuss with you when you will be able to drive again.  · Weight bearing is as tolerated  · First week stay inside on even terrain. May go up and down stairs one stair at a time utilizing the hand rail  · After one week, you may venture outside.     III. Precautions:  · Everyone that comes near you should wash their hands  · No elective dental, genital-urinary, or colon procedures or surgical procedures for 12 weeks after surgery unless absolutely necessary.  ·  If dental work or surgical procedure is deemed absolutely necessary, you will need to contact your surgeon as you will need to take antibiotics 1 hour prior to any dental work (including teeth cleanings).  · Please discuss with your surgeon  prophylactic antibiotics as the length of time this intervention will be necessary for you varies with each patient’s health history and situation.  · Avoid sick people. If you must be around someone who is ill, they should wear a mask.  · Avoid visits to the Emergency Room or Urgent Care unless you are having a life threatening event.   · If ordered stockings are to be placed on in the morning and removed at night. Monitor the stockings to ensure that any swelling is not causing the stockings to become too tight. In this case, remove stockings immediately.     IV. INCISION CARE:  · Wash your hands prior to dressing changes  · Change the dressing as needed to keep incision clean and dry. Utilize dry gauze and paper tape. Avoid touching the side of the gauze that goes against the incision with your hands.  · No creams or ointments to the incision  · May remove dressing once the incision is free of drainage  · Do not touch or pick at the incision  · Check incision every day and notify surgeon immediately if any of the following signs or symptoms are noted:  ? Increase in redness  ? Increase in swelling around the incision and of the entire extremity  ? Increase in pain  ? Drainage oozing from the incision  ? Pulling apart of the edges of the incision  ? Increase in overall body temperature (greater than 100.5 degrees)  · Your surgeon will instruct you regarding suture or staple removal     V. Medications:   1. Anticoagulants: You will be discharged on an anticoagulant. This is a prophylactic medication that helps prevent blood clots during your post-operative period. The type and length of dosage varies based on your individual needs, procedure performed, and surgeon’s preference.  · While taking the anticoagulant, you should avoid taking any additional aspirin, ibuprofen (Advil or Motrin), Aleve (Naprosyn) or other non-steroidal anti-inflammatory medications.   · Notify surgeon immediately if any cynthia bleeding is  noted in the urine, stool, emesis, or from the nose or the incision. Blood in the stool will often appear as black rather than red. Blood in urine may appear as pink. Blood in emesis may appear as brown/black like coffee grounds.  · You will need to apply pressure for longer periods of time to any cuts or abrasions to stop bleeding  · Avoid alcohol while taking anticoagulants     2. Stool Softeners: You will be at greater risk of constipation after surgery due to being less mobile and the pain medications.   · Take stool softeners as instructed by your surgeon while on pain medications. Over the counter Colace 100 mg 1-2 capsules twice daily.   · If stools become too loose or too frequent, please decreases the dosage or stop the stool softener.  · If constipation occurs despite use of stool softeners, you are to continue the stool softeners and add a laxative (Milk of Magnesia 1 ounce daily as needed)  · Drink plenty of fluids, and eat fruits and vegetables during your recovery time     3. Pain Medications utilized after surgery are narcotics and the law requires that the following information be given to all patients that are prescribed narcotics:  · CLASSIFICATION: Pain medications are called Opioids and are narcotics  · LEGALITIES: It is illegal to share narcotics with others and to drive within 24 hours of taking narcotics  · POTENTIAL SIDE EFFECTS: Potential side effects of opioids include: nausea, vomiting, itching, dizziness, drowsiness, dry mouth, constipation, and difficulty urinating.  · POTENTIAL ADVERSE EFFECTS:   ? Opioid tolerance can develop with use of pain medications and this simply means that it requires more and more of the medication to control pain; however, this is seen more in patients that use opioids for longer periods of time.  ? Opioid dependence can develop with use of Opioids and this simply means that to stop the medication can cause withdrawal symptoms; however, this is seen with  patients that use Opioids for longer periods of time.  ? Opioid addiction can develop with use of Opioids and the incidence of this is very unlikely in patients who take the medications as ordered and stop the medications as instructed.  ? Opioid overdose can be dangerous, but is unlikely when the medication is taken as ordered and stopped when ordered. It is important not to mix opioids with alcohol or with and type of sedative such as Benadryl as this can lead to over sedation and respiratory difficulty.  · DOSAGE:   ? Pain medications will need to be taken consistently for the first week to decrease pain and promote adequate pain relief and participation in physical therapy.  ? After the initial surgical pain begins to resolve, you may begin to decrease the pain medication. By the end of 6-8 weeks, you should be off of pain medications.  ? Refills will not be given by the office during evening hours, on weekends, or after 6-8 weeks post-op.  ? To seek refills on pain medications during the initial 6 week post-operative period, you must call the office 48 hours in advance to request the refill. The office will then notify you when to  the prescription. DO NOT wait until you are out of the medication to request a refill.     V. FOLLOW-UP VISITS:  · You will need to follow up in the office with your surgeon in 3 weeks. Please call this number [phone] to schedule this appointment.  If you have any concerns or suspected complications prior to your follow up visit, please call your surgeons office. Do not wait until your appointment time if you suspect complications. These will need to be addressed in the office promptly.

## 2020-09-02 NOTE — THERAPY EVALUATION
Patient Name: Paolo Calvo  : 1950    MRN: [id number]                              Today's Date: 2020       Admit Date: 2020    Visit Dx:     ICD-10-CM ICD-9-CM   1. Primary osteoarthritis of right knee M17.11 715.16     Patient Active Problem List   Diagnosis   • Primary osteoarthritis of right knee     Past Medical History:   Diagnosis Date   • Arthritis    • Diabetes mellitus (CMS/HCC)     DIET CONTROLLED   • Disease of thyroid gland    • GERD (gastroesophageal reflux disease)    • Hyperlipidemia    • Hypertension    • Psoriasis    • Right knee pain    • Seasonal allergies    • Sleep apnea     MACHINE     Past Surgical History:   Procedure Laterality Date   • ADENOIDECTOMY     • COLONOSCOPY     • EXTRACORPOREAL SHOCK WAVE LITHOTRIPSY (ESWL)     • SEPTOPLASTY     • TONSILLECTOMY     • TOTAL KNEE ARTHROPLASTY Right 2020    Procedure: RIGHT TOTAL KNEE ARTHROPLASTY;  Surgeon: Zafar Christensen MD;  Location: [facility];  Service: Orthopedics;  Laterality: Right;     General Information     Row Name 20 1050          PT Evaluation Time/Intention    Document Type  therapy note (daily note)  -SV     Mode of Treatment  individual therapy;physical therapy  -     Row Name 20 1050          General Information    Patient Profile Reviewed?  yes  -SV     Prior Level of Function  independent:  -SV     Existing Precautions/Restrictions  no known precautions/restrictions  -     Row Name 20 1050          Relationship/Environment    Lives With  spouse  -     Row Name 20 1050          Home Main Entrance    Number of Stairs, Main Entrance  three  -SV     Stair Railings, Main Entrance  railing on right side (ascending)  -     Row Name 20 1050          Cognitive Assessment/Intervention- PT/OT    Orientation Status (Cognition)  oriented x 4  -SV       User Key  (r) = Recorded By, (t) = Taken By, (c) = Cosigned By    Initials Name Provider Type    SV Loyda Cole, PT  Physical Therapist        Mobility     Row Name 09/02/20 1736          Bed Mobility Assessment/Treatment    Supine-Sit Weld (Bed Mobility)  supervision  -SV     Assistive Device (Bed Mobility)  bed rails  -SV     Row Name 09/02/20 1736          Sit-Stand Transfer    Sit-Stand Weld (Transfers)  contact guard;verbal cues  -SV     Assistive Device (Sit-Stand Transfers)  walker, front-wheeled  -SV     Row Name 09/02/20 1736          Gait/Stairs Assessment/Training    Weld Level (Gait)  contact guard  -SV     Assistive Device (Gait)  walker, front-wheeled  -SV     Distance in Feet (Gait)  100' reported nausea, slow gaurded gait, slight antalgic  -SV     [place]eld Level (Stairs)  contact guard;verbal cues  -SV     Assistive Device (Stairs)  cane, quad  -SV     Handrail Location (Stairs)  right side (ascending)  -SV     Number of Steps (Stairs)  4  -SV     Ascending Technique (Stairs)  step-to-step  -SV     Comment (Gait/Stairs)  supervision on 2nd trial with railing and cane  -SV       User Key  (r) = Recorded By, (t) = Taken By, (c) = Cosigned By    Initials Name Provider Type    SV Loyda Cole, PT Physical Therapist        Obj/Interventions     Row Name 09/02/20 1738          General ROM    GENERAL ROM COMMENTS  BUE, LLE arom wfl, right ankle wfl,   -SV     Row Name 09/02/20 1738          MMT (Manual Muscle Testing)    General MMT Comments  BUE, LLE >3/5 , right ankle >3/5 right knee 3-/5  -SV     Row Name 09/02/20 1054          Therapeutic Exercise    Sets/Reps (Therapeutic Exercise)  10 reps slr, hs, qs, qs, a/p, saq vc  -SV     Row Name 09/02/20 1738 09/02/20 1054       Static Sitting Balance    Level of Weld (Unsupported Sitting, Static Balance)  supervision  -SV  supervision  -SV    Sitting Position (Unsupported Sitting, Static Balance)  sitting on edge of bed  -SV  sitting on edge of bed  -SV    Time Able to Maintain Position (Unsupported Sitting, Static Balance)  4 to 5  minutes  -SV  2 to 3 minutes  -SV    Row Name 09/02/20 1738 09/02/20 1054       Static Standing Balance    Level of [place] (Supported Standing, Static Balance)  contact guard assist;supervision  -SV  contact guard assist  -SV    Time Able to Maintain Position (Supported Standing, Static Balance)  1 to 2 minutes  -SV  --    Assistive Device Utilized (Supported Standing, Static Balance)  walker, rolling  -SV  walker, rolling  -SV    Row Name 09/02/20 1738          Sensory Assessment/Intervention    Sensory General Assessment  -- no reports of numbness and tingling  -SV       User Key  (r) = Recorded By, (t) = Taken By, (c) = Cosigned By    Initials Name Provider Type    SV Loyda Cole, PT Physical Therapist        Goals/Plan     Row Name 09/02/20 1741          Bed Mobility Goal 1 (PT)    Activity/Assistive Device (Bed Mobility Goal 1, PT)  sit to supine/supine to sit  -SV     [place] Level/Cues Needed (Bed Mobility Goal 1, PT)  conditional independence;supervision required  -SV     Time Frame (Bed Mobility Goal 1, PT)  by discharge  -SV     Row Name 09/02/20 1741          Transfer Goal 1 (PT)    Activity/Assistive Device (Transfer Goal 1, PT)  sit-to-stand/stand-to-sit;walker, rolling  -SV     [place] Level/Cues Needed (Transfer Goal 1, PT)  supervision required  -SV     Time Frame (Transfer Goal 1, PT)  by discharge  -SV     Row Name 09/02/20 1741          Gait Training Goal 1 (PT)    Activity/Assistive Device (Gait Training Goal 1, PT)  gait (walking locomotion);assistive device use;walker, rolling  -SV     [place] Level (Gait Training Goal 1, PT)  supervision required  -SV     Distance (Gait Goal 1, PT)  100'  -SV     Time Frame (Gait Training Goal 1, PT)  by discharge  -SV     Row Name 09/02/20 1741          Patient Education Goal (PT)    Activity (Patient Education Goal, PT)  HEP  -SV     Time Frame (Patient Education Goal, PT)  by discharge  -SV       User Key  (r) = Recorded By, (t) =  Taken By, (c) = Cosigned By    Initials Name Provider Type    SV Loyda Cole, PT Physical Therapist        Clinical Impression     Row Name 09/02/20 1740 09/02/20 1055       Pain Assessment    Additional Documentation  Pain Scale: Numbers Pre/Post-Treatment (Group)  -SV  Pain Scale: Numbers Pre/Post-Treatment (Group)  -SV    Row Name 09/02/20 1740 09/02/20 1055       Pain Scale: Numbers Pre/Post-Treatment    Pain Scale: Numbers, Pretreatment  5/10  -SV  5/10  -SV    Pain Location - Side  --  Right  -SV    Pain Location  --  knee  -SV    Row Name 09/02/20 1740          Plan of Care Review    Plan of Care Reviewed With  patient  -SV     Row Name 09/02/20 1740          Physical Therapy Clinical Impression    Patient/Family Goals Statement (PT Clinical Impression)  goal for painfree amb  -SV     Criteria for Skilled Interventions Met (PT Clinical Impression)  yes  -SV     Rehab Potential (PT Clinical Summary)  good, to achieve stated therapy goals  -SV     Row Name 09/02/20 1740 09/02/20 1055       Vital Signs    O2 Delivery Pre Treatment  room air  -SV  room air  -SV    O2 Delivery Intra Treatment  room air  -SV  room air  -SV    O2 Delivery Post Treatment  room air  -SV  room air  -SV    Pre Patient Position  Supine  -SV  Supine  -SV    Intra Patient Position  Standing  -SV  Standing  -SV    Post Patient Position  Sitting  -SV  Sitting  -SV    Row Name 09/02/20 1740 09/02/20 1055       Positioning and Restraints    Pre-Treatment Position  in bed  -SV  in bed  -SV    Post Treatment Position  chair  -SV  chair  -SV    In Chair  reclined;call light within reach;encouraged to call for assist;exit alarm on  -SV  reclined;call light within reach;encouraged to call for assist;with family/caregiver;exit alarm on  -SV      User Key  (r) = Recorded By, (t) = Taken By, (c) = Cosigned By    Initials Name Provider Type    Loyda Kim, PT Physical Therapist        Outcome Measures     Row Name 09/02/20 1104           How much help from another person do you currently need...    Turning from your back to your side while in flat bed without using bedrails?  4  -SV     Moving from lying on back to sitting on the side of a flat bed without bedrails?  4  -SV     Moving to and from a bed to a chair (including a wheelchair)?  4  -SV     Standing up from a chair using your arms (e.g., wheelchair, bedside chair)?  4  -SV     Climbing 3-5 steps with a railing?  3  -SV     To walk in hospital room?  4  -SV     AM-PAC 6 Clicks Score (PT)  23  -SV     Row Name 09/02/20 1101          Functional Assessment    Outcome Measure Options  AM-PAC 6 Clicks Basic Mobility (PT)  -SV       User Key  (r) = Recorded By, (t) = Taken By, (c) = Cosigned By    Initials Name Provider Type    Loyda Kim, PT Physical Therapist        Physical Therapy Education                 Title: PT OT SLP Therapies (Done)     Topic: Physical Therapy (Done)     Point: Mobility training (Done)     Description:   Instruct learner(s) on safety and technique for assisting patient out of bed, chair or wheelchair.  Instruct in the proper use of assistive devices, such as walker, crutches, cane or brace.              Patient Friendly Description:   It's important to get you on your feet again, but we need to do so in a way that is safe for you. Falling has serious consequences, and your personal safety is the most important thing of all.        When it's time to get out of bed, one of us or a family member will sit next to you on the bed to give you support.     If your doctor or nurse tells you to use a walker, crutches, a cane, or a brace, be sure you use it every time you get out of bed, even if you think you don't need it.    Learning Progress Summary           Patient Acceptance, E,TB,MICHAEL, RADHA,DU by SV at 9/2/2020 1101    Acceptance, E,D, RADHA,NR by MS at 9/1/2020 3757                   Point: Home exercise program (Done)     Description:   Instruct learner(s) on  appropriate technique for monitoring, assisting and/or progressing patient with therapeutic exercises and activities.              Learning Progress Summary           Patient Acceptance, E,TB,D, VU,DU by SV at 9/2/2020 1101    Acceptance, E,D, VU,NR by MS at 9/1/2020 1557                   Point: Body mechanics (Done)     Description:   Instruct learner(s) on proper positioning and spine alignment for patient and/or caregiver during mobility tasks and/or exercises.              Learning Progress Summary           Patient Acceptance, E,TB,D, VU,DU by SV at 9/2/2020 1101    Acceptance, E,D, VU,NR by MS at 9/1/2020 1557                   Point: Precautions (Done)     Description:   Instruct learner(s) on prescribed precautions during mobility and gait tasks              Learning Progress Summary           Patient Acceptance, E,TB,D, VU,DU by SV at 9/2/2020 1101    Acceptance, E,D, VU,NR by MS at 9/1/2020 1557                               User Key     Initials Effective Dates Name Provider Type Discipline    MS 04/03/18 -  Marito Nieto, PT Physical Therapist PT     04/03/18 -  Loyda Cole, PT Physical Therapist PT              PT Recommendation and Plan  Planned Therapy Interventions (PT Eval): balance training, bed mobility training, patient/family education, strengthening, stretching, stair training, home exercise program  Plan of Care Reviewed With: patient     Time Calculation:   PT Charges     Row Name 09/02/20 1746             Time Calculation    Start Time  1056  -SV      Stop Time  1035  -SV      Time Calculation (min)  1419 min  -SV      PT Received On  09/02/20  -      PT - Next Appointment  09/03/20  -      PT Goal Re-Cert Due Date  09/05/20  -        User Key  (r) = Recorded By, (t) = Taken By, (c) = Cosigned By    Initials Name Provider Type    SV Loyda Cole, PT Physical Therapist        Therapy Charges for Today     Code Description Service Date Service Provider Modifiers Qty     54204815777 HC PT EVAL MOD COMPLEXITY 2 9/2/2020 Loyda Cole, PT GP 1    61796054371 HC PT THERAPEUTIC ACT EA 15 MIN 9/2/2020 Loyda Cole, PT GP 1          PT G-Codes  Outcome Measure Options: AM-PAC 6 Clicks Basic Mobility (PT)  AM-PAC 6 Clicks Score (PT): 23    Loyda Cole, PT  9/2/2020

## 2020-09-02 NOTE — PLAN OF CARE
Problem: Patient Care Overview  Goal: Plan of Care Review  Outcome: Ongoing (interventions implemented as appropriate)  Flowsheets (Taken 9/2/2020 3730)  Progress: improving  Plan of Care Reviewed With: patient  Outcome Summary: Pt is 69 y/o male, pod 1 right TKA, dressing is CDI, vss, n/v intact. Pt ambulating with PT and staff. Pt is still experiencing urinary retention. He was able to void a small amount, but was straight cathed for high PVR. Urology consulted, awaiting their response. Pt voices hesitancy at d/c home today d/t urinary retention. PA told the pt that it is his decision for discharge today or tomorrow. Will continue to monitor.

## 2020-09-02 NOTE — PROGRESS NOTES
Orthopaedic Surgery  Progress Note  9/2/2020    Patients Name:  Paolo Calvo  YOB: 1950  Age: 70 y.o.  Medical Records Number:  [id number]  Date of Admission: 9/1/2020    No complaints except pain    Vitals:  Vitals:    09/01/20 1200 09/01/20 1900 09/01/20 2255 09/02/20 0343   BP: 150/86 155/88 103/70 113/70   BP Location: Right arm Right arm Right arm Right arm   Patient Position: Lying Lying Lying Lying   Pulse: 101 91 80 83   Resp: 16 16 16 14   Temp:  97.1 °F (36.2 °C) 98 °F (36.7 °C) 98 °F (36.7 °C)   TempSrc:  Oral Oral Oral   SpO2: 94% 94% 93% 95%   Weight:       Height:           RLE:  NVI, calf nontender, sensation intact  No signs of DVT    Incision: clean, no signs of infection    Lab Results (last 24 hours)     Procedure Component Value Units Date/Time    Basic Metabolic Panel [[id number]]  (Abnormal) Collected:  09/02/20 0448    Specimen:  Blood Updated:  09/02/20 0551     Glucose 134 mg/dL      BUN 19 mg/dL      Creatinine 1.14 mg/dL      Sodium 136 mmol/L      Potassium 4.1 mmol/L      Chloride 99 mmol/L      CO2 28.0 mmol/L      Calcium 8.9 mg/dL      eGFR Non African Amer 64 mL/min/1.73      BUN/Creatinine Ratio 16.7     Anion Gap 9.0 mmol/L     Narrative:       GFR Normal >60  Chronic Kidney Disease <60  Kidney Failure <15      CBC (No Diff) [[id number]]  (Abnormal) Collected:  09/02/20 0448    Specimen:  Blood Updated:  09/02/20 0525     WBC 11.84 10*3/mm3      RBC 3.77 10*6/mm3      Hemoglobin 11.5 g/dL      Hematocrit 35.1 %      MCV 93.1 fL      MCH 30.5 pg      MCHC 32.8 g/dL      RDW 13.3 %      RDW-SD 45.1 fl      MPV 9.9 fL      Platelets 220 10*3/mm3           Xr Knee 1 Or 2 View Right    Result Date: 9/1/2020  Narrative: STAT 2 PORTABLE VIEWS RIGHT KNEE 09/01/2020  CLINICAL HISTORY: Postop total right knee arthroplasty.  COMPARISON: This is correlated to preoperative views 08/18/2020.  FINDINGS: There has been performance of a total right knee arthroplasty with good  alignment of the femoral-tibial compartments of the right knee prosthesis. There has been posterior patellar resurfacing. Drain projects over the suprapatellar bursa. There is postoperative air in the soft tissues anterior to the right knee joint, anterior midline cutaneous staples are in place. No superimposed acute fracture or malalignment is seen in the bones of the right knee.      Impression: Patient status post total right knee arthroplasty with good alignment of hardware. No complicating features are seen.  This report was finalized on 9/1/2020 10:57 AM by Dr. Zafar Quiles M.D.      Xr Knee 1 Or 2 View Right    Result Date: 8/18/2020  Narrative: XR CHEST PA AND LATERAL-, XR KNEE 1 OR 2 VW RIGHT-  CLINICAL INFORMATION: Preop right knee arthroplasty.  COMPARISON: None.  CHEST FINDINGS: Cardiac size within normal limits. No mediastinal or hilar abnormality. No effusion, edema or consolidation.  CONCLUSION: No active disease of the chest.  RIGHT KNEE FINDINGS: There is medial compartment and patellofemoral joint space loss. There is patellar spur formation. No bone lesion, fracture or dislocation nor joint effusion.  CONCLUSION: Substantial degenerative change involving the right knee.  This report was finalized on 8/18/2020 1:26 PM by Dr. Suhail Magana M.D.      Xr Chest Pa & Lateral    Result Date: 8/18/2020  Narrative: XR CHEST PA AND LATERAL-, XR KNEE 1 OR 2 VW RIGHT-  CLINICAL INFORMATION: Preop right knee arthroplasty.  COMPARISON: None.  CHEST FINDINGS: Cardiac size within normal limits. No mediastinal or hilar abnormality. No effusion, edema or consolidation.  CONCLUSION: No active disease of the chest.  RIGHT KNEE FINDINGS: There is medial compartment and patellofemoral joint space loss. There is patellar spur formation. No bone lesion, fracture or dislocation nor joint effusion.  CONCLUSION: Substantial degenerative change involving the right knee.  This report was finalized on 8/18/2020 1:26 PM by   Suhail Magana M.D.        Assesment/Plan:    Procedures:  Right TKA  Postoperative Day: 1  Weightbearing Status:  WBAT with walker  DVT Prophylaxis:  ASA for DVT prophylaxis    Disposition:  Home with home health after PT today, if comfortable and mobilizing safely    Zafar COVINGTON [facility]  [address] [place] [address]  [phone]    9/2/2020

## 2020-09-02 NOTE — PLAN OF CARE
Problem: Patient Care Overview  Goal: Plan of Care Review  Outcome: Ongoing (interventions implemented as appropriate)  Note:   Pt s/p TKA on right with resulting impaired rom and strength/impaired mobility. Pt amb 100' today with cga using rwx and was able to asc/desc 4 steps with rail and cane with cga. Pt rwx delivered today. PT recommended pt order cane for steps at d/c. Pt is ready home with HHPT at this time with family supervision/asst prn. Pt will benefit from cont skilled PT for progression with rom and gait training as tolerated.

## 2020-09-02 NOTE — PROGRESS NOTES
Continued Stay Note  [facility]     Patient Name: Paolo Calvo  MRN: [id number]  Today's Date: 9/2/2020    Admit Date: 9/1/2020    Discharge Plan     Row Name 09/02/20 1241       Plan    Final Discharge Disposition Code  06 - home with home health care    Final Note  Pt to d/c home with Located within [facility].        Discharge Codes    No documentation.       Expected Discharge Date and Time     Expected Discharge Date Expected Discharge Time    Sep 2, 2020             Elda Bass RN

## 2020-09-02 NOTE — PLAN OF CARE
Problem: Patient Care Overview  Goal: Plan of Care Review  Outcome: Ongoing (interventions implemented as appropriate)  Flowsheets (Taken 9/2/2020 0146)  Progress: improving  Plan of Care Reviewed With: patient  Outcome Summary: vss, dsg c/d/i, neurovascular status wnl, unable to void and required i/o cath, reports adequate pain control, educated on monitoring b/p due to hypertensive hx, discharge plan is for home when medically stable, [facility]

## 2020-09-02 NOTE — DISCHARGE SUMMARY
Orthopaedic Surgery Discharge Summary    Patient Name:  Paolo Calvo  YOB: 1950  Age: 70 y.o.  Medical Records Number:  [id number]    Date of Admission:  9/1/2020  Date of Discharge:  9/3/2020    Primary Discharge Diagnosis:  Primary osteoarthritis of right knee [M17.11]    Secondary Discharge Diagnosis:    Problem List Items Addressed This Visit        Musculoskeletal and Integument    Primary osteoarthritis of right knee - Primary    Relevant Medications    oxyCODONE-acetaminophen (PERCOCET) 5-325 MG per tablet    Other Relevant Orders    Walker          Procedures Performed:  Right Total Knee Arthroplasty      Hospital Course:    Paolo Calvo is a 70 y.o.  male who underwent successful right tka on 9/1/2020.  Paolo Calvo was started on Aspirin 325 mg po twice daily post-operatively for DVT prophylaxis.  On post-op day 1 the patients dressing was changed and their incision was clean, with no signs of infection and their calf was soft, with no signs of DVT.  The patient progressed well with physical therapy and the patients hemoglobin remained stable. He was suffering from urinary retention on POD 1, Urology was consulted.  On post-operative day 2 the patient was felt ready for discharge.     Vitals:  Vitals:    09/01/20 1200 09/01/20 1900 09/01/20 2255 09/02/20 0343   BP: 150/86 155/88 103/70 113/70   BP Location: Right arm Right arm Right arm Right arm   Patient Position: Lying Lying Lying Lying   Pulse: 101 91 80 83   Resp: 16 16 16 14   Temp:  97.1 °F (36.2 °C) 98 °F (36.7 °C) 98 °F (36.7 °C)   TempSrc:  Oral Oral Oral   SpO2: 94% 94% 93% 95%   Weight:       Height:           Discharge Medications:      Discharge Medications      New Medications      Instructions Start Date   docusate sodium 100 MG capsule   100 mg, Oral, 2 Times Daily PRN      oxyCODONE-acetaminophen 5-325 MG per tablet  Commonly known as:  PERCOCET   1-2 tablets, Oral, Every 4 Hours PRN         Changes to Medications       Instructions Start Date   aspirin 325 MG EC tablet  What changed:    · medication strength  · how much to take  · when to take this  · additional instructions   325 mg, Oral, 2 Times Daily With Meals      atorvastatin 20 MG tablet  Commonly known as:  LIPITOR  What changed:  when to take this   20 mg, Oral, Daily      lisinopril-hydrochlorothiazide 20-12.5 MG per tablet  Commonly known as:  PRINZIDE,ZESTORETIC  What changed:  when to take this   1 tablet, Oral, Daily         Continue These Medications      Instructions Start Date   clobetasol 0.05 % external solution  Commonly known as:  TEMOVATE   1 application, Topical, 2 Times Daily      Cyanocobalamin 1000 MCG capsule   Oral, 2 Times Weekly, HOLD PRIOR TO SURG      Fiber powder   Oral, Daily      ketoconazole 2 % shampoo  Commonly known as:  NIZORAL   1 application, Topical, Daily PRN      Melatonin 10 MG tablet   10 mg, Oral, Nightly      omeprazole 20 MG capsule  Commonly known as:  priLOSEC   20 mg, Oral, MON,WED AND FRI      Triamcinolone Acetonide 55 MCG/ACT nasal inhaler  Commonly known as:  NASACORT   2 sprays, Nasal, Nightly         Stop These Medications    CHLORHEXIDINE GLUCONATE CLOTH EX     mupirocin 2 % nasal ointment  Commonly known as:  BACTROBAN            Pain Medications:  Percocet 5/325 mg 1-2 po q 4-6 hours prn pain    DVT Prophylaxis:  Enteric Coated Aspirin 325 mg po twice daily for 2 weeks, then one daily for 4 weeks      Total Knee Joint Replacement Discharge Instructions:    I. ACTIVITIES:  1. Exercises:  ? Complete exercise program as taught by the hospital physical therapist 2 times per day  ? Exercise program will be advanced by the physical therapist  ? During the day be up ambulating every 2 hours (while awake) for short distances  ? Complete the ankle pump exercises at least 10 times per hour (while awake)  ? Elevate legs most of the day the first week post operatively and thereafter elevate legs when in bed and for at least 30  minutes during the day. Caution must be taken to avoid pillow placement under the bend of the knee as this can led to flexion contractures of the knee.  ? Use cold packs 20-30 minutes approximately 5 times per day. This should be done before and after completing your exercises and at any time you are experiencing pain/ stiffness in your operative extremity.      2. Activities of Daily Living:  ? No tub baths, hot tubs, or swimming pools for 4 weeks  ? May shower and let water run over the incision on post-operative day #5 if no drainage. Do not scrub or rub the incision. Simply let the water run over the incision and pat dry.    II. Restrictions  ? Do not cross legs or kneel  ? Your surgeon will discuss with you when you will be able to drive again.  ? Weight bearing is as tolerated  ? First week stay inside on even terrain. May go up and down stairs one stair at a time utilizing the hand rail  ? After one week, you may venture outside.    III. Precautions:  ? Everyone that comes near you should wash their hands  ? No elective dental, genital-urinary, or colon procedures or surgical procedures for 12 weeks after surgery unless absolutely necessary.  ?  If dental work or surgical procedure is deemed absolutely necessary, you will need to contact your surgeon as you will need to take antibiotics 1 hour prior to any dental work (including teeth cleanings).  ? Please discuss with your surgeon prophylactic antibiotics as the length of time this intervention will be necessary for you varies with each patient’s health history and situation.  ? Avoid sick people. If you must be around someone who is ill, they should wear a mask.  ? Avoid visits to the Emergency Room or Urgent Care unless you are having a life threatening event.   ? If ordered stockings are to be placed on in the morning and removed at night. Monitor the stockings to ensure that any swelling is not causing the stockings to become too tight. In this case,  remove stockings immediately.    IV. INCISION CARE:  ? Wash your hands prior to dressing changes  ? Change the dressing as needed to keep incision clean and dry. Utilize dry gauze and paper tape. Avoid touching the side of the gauze that goes against the incision with your hands.  ? No creams or ointments to the incision  ? May remove dressing once the incision is free of drainage  ? Do not touch or pick at the incision  ? Check incision every day and notify surgeon immediately if any of the following signs or symptoms are noted:  o Increase in redness  o Increase in swelling around the incision and of the entire extremity  o Increase in pain  o Drainage oozing from the incision  o Pulling apart of the edges of the incision  o Increase in overall body temperature (greater than 100.5 degrees)  ? Your surgeon will instruct you regarding suture or staple removal    V. Medications:   1. Anticoagulants: You will be discharged on an anticoagulant. This is a prophylactic medication that helps prevent blood clots during your post-operative period. The type and length of dosage varies based on your individual needs, procedure performed, and surgeon’s preference.  ? While taking the anticoagulant, you should avoid taking any additional aspirin, ibuprofen (Advil or Motrin), Aleve (Naprosyn) or other non-steroidal anti-inflammatory medications.   ? Notify surgeon immediately if any cynthia bleeding is noted in the urine, stool, emesis, or from the nose or the incision. Blood in the stool will often appear as black rather than red. Blood in urine may appear as pink. Blood in emesis may appear as brown/black like coffee grounds.  ? You will need to apply pressure for longer periods of time to any cuts or abrasions to stop bleeding  ? Avoid alcohol while taking anticoagulants    2. Stool Softeners: You will be at greater risk of constipation after surgery due to being less mobile and the pain medications.   ? Take stool softeners as  instructed by your surgeon while on pain medications. Over the counter Colace 100 mg 1-2 capsules twice daily.   ? If stools become too loose or too frequent, please decreases the dosage or stop the stool softener.  ? If constipation occurs despite use of stool softeners, you are to continue the stool softeners and add a laxative (Milk of Magnesia 1 ounce daily as needed)  ? Drink plenty of fluids, and eat fruits and vegetables during your recovery time    3. Pain Medications utilized after surgery are narcotics and the law requires that the following information be given to all patients that are prescribed narcotics:  ? CLASSIFICATION: Pain medications are called Opioids and are narcotics  ? LEGALITIES: It is illegal to share narcotics with others and to drive within 24 hours of taking narcotics  ? POTENTIAL SIDE EFFECTS: Potential side effects of opioids include: nausea, vomiting, itching, dizziness, drowsiness, dry mouth, constipation, and difficulty urinating.  ? POTENTIAL ADVERSE EFFECTS:   o Opioid tolerance can develop with use of pain medications and this simply means that it requires more and more of the medication to control pain; however, this is seen more in patients that use opioids for longer periods of time.  o Opioid dependence can develop with use of Opioids and this simply means that to stop the medication can cause withdrawal symptoms; however, this is seen with patients that use Opioids for longer periods of time.  o Opioid addiction can develop with use of Opioids and the incidence of this is very unlikely in patients who take the medications as ordered and stop the medications as instructed.  o Opioid overdose can be dangerous, but is unlikely when the medication is taken as ordered and stopped when ordered. It is important not to mix opioids with alcohol or with and type of sedative such as Benadryl as this can lead to over sedation and respiratory difficulty.  ? DOSAGE:   o Pain medications  will need to be taken consistently for the first week to decrease pain and promote adequate pain relief and participation in physical therapy.  o After the initial surgical pain begins to resolve, you may begin to decrease the pain medication. By the end of 6-8 weeks, you should be off of pain medications.  o Refills will not be given by the office during evening hours, on weekends, or after 6-8 weeks post-op.  o To seek refills on pain medications during the initial 6 week post-operative period, you must call the office 48 hours in advance to request the refill. The office will then notify you when to  the prescription. DO NOT wait until you are out of the medication to request a refill.    V. FOLLOW-UP VISITS:  ? You will need to follow up in the office with your surgeon in 3 weeks. Please call this number [phone] to schedule this appointment.  If you have any concerns or suspected complications prior to your follow up visit, please call your surgeons office. Do not wait until your appointment time if you suspect complications. These will need to be addressed in the office promptly.    Zafar Sebastian PA-C  [facility]  [address] [place] [address]  [phone]    9/2/2020    CC:Kerry Mcclure MD:YARITZA Adames

## 2020-09-03 VITALS
HEART RATE: 107 BPM | OXYGEN SATURATION: 95 % | TEMPERATURE: 98.4 F | WEIGHT: 193 LBS | DIASTOLIC BLOOD PRESSURE: 90 MMHG | BODY MASS INDEX: 32.15 KG/M2 | HEIGHT: 65 IN | SYSTOLIC BLOOD PRESSURE: 147 MMHG | RESPIRATION RATE: 16 BRPM

## 2020-09-03 PROCEDURE — 97116 GAIT TRAINING THERAPY: CPT

## 2020-09-03 PROCEDURE — 97110 THERAPEUTIC EXERCISES: CPT

## 2020-09-03 RX ORDER — TAMSULOSIN HYDROCHLORIDE 0.4 MG/1
0.8 CAPSULE ORAL DAILY
Status: DISCONTINUED | OUTPATIENT
Start: 2020-09-03 | End: 2020-09-03 | Stop reason: HOSPADM

## 2020-09-03 RX ADMIN — OXYCODONE HYDROCHLORIDE AND ACETAMINOPHEN 1 TABLET: 5; 325 TABLET ORAL at 12:06

## 2020-09-03 RX ADMIN — TAMSULOSIN HYDROCHLORIDE 0.4 MG: 0.4 CAPSULE ORAL at 07:57

## 2020-09-03 RX ADMIN — PANTOPRAZOLE SODIUM 40 MG: 40 TABLET, DELAYED RELEASE ORAL at 07:57

## 2020-09-03 RX ADMIN — DOCUSATE SODIUM 100 MG: 100 CAPSULE, LIQUID FILLED ORAL at 07:57

## 2020-09-03 RX ADMIN — OXYCODONE HYDROCHLORIDE AND ACETAMINOPHEN 1 TABLET: 5; 325 TABLET ORAL at 07:57

## 2020-09-03 RX ADMIN — SODIUM CHLORIDE, PRESERVATIVE FREE 3 ML: 5 INJECTION INTRAVENOUS at 09:21

## 2020-09-03 RX ADMIN — OXYCODONE HYDROCHLORIDE AND ACETAMINOPHEN 1 TABLET: 5; 325 TABLET ORAL at 02:43

## 2020-09-03 RX ADMIN — ASPIRIN 325 MG: 325 TABLET, COATED ORAL at 07:56

## 2020-09-03 RX ADMIN — FERROUS SULFATE TAB 325 MG (65 MG ELEMENTAL FE) 325 MG: 325 (65 FE) TAB at 07:56

## 2020-09-03 NOTE — THERAPY TREATMENT NOTE
Patient Name: Paolo Calvo  : 1950    MRN: [id number]                              Today's Date: 9/3/2020       Admit Date: 2020    Visit Dx:     ICD-10-CM ICD-9-CM   1. Primary osteoarthritis of right knee M17.11 715.16     Patient Active Problem List   Diagnosis   • Primary osteoarthritis of right knee     Past Medical History:   Diagnosis Date   • Arthritis    • Diabetes mellitus (CMS/HCC)     DIET CONTROLLED   • Disease of thyroid gland    • GERD (gastroesophageal reflux disease)    • Hyperlipidemia    • Hypertension    • Psoriasis    • Right knee pain    • Seasonal allergies    • Sleep apnea     MACHINE     Past Surgical History:   Procedure Laterality Date   • ADENOIDECTOMY     • COLONOSCOPY     • EXTRACORPOREAL SHOCK WAVE LITHOTRIPSY (ESWL)     • SEPTOPLASTY     • TONSILLECTOMY     • TOTAL KNEE ARTHROPLASTY Right 2020    Procedure: RIGHT TOTAL KNEE ARTHROPLASTY;  Surgeon: Zafar Christensen MD;  Location: [facility];  Service: Orthopedics;  Laterality: Right;     General Information     Row Name 2043          PT Evaluation Time/Intention    Document Type  therapy note (daily note)  -     Mode of Treatment  individual therapy;physical therapy  -     Row Name 20 0943          General Information    Existing Precautions/Restrictions  fall  -     Row Name 20          Cognitive Assessment/Intervention- PT/OT    Orientation Status (Cognition)  oriented x 4  -     Row Name 20          Safety Issues, Functional Mobility    Impairments Affecting Function (Mobility)  range of motion (ROM);pain  -       User Key  (r) = Recorded By, (t) = Taken By, (c) = Cosigned By    Initials Name Provider Type     Katarzyna Mike PTA Physical Therapy Assistant        Mobility     Row Name 20          Bed Mobility Assessment/Treatment    Supine-Sit [place] (Bed Mobility)  supervision  -     Assistive Device (Bed Mobility)  head of bed elevated;bed  rails  -EH     Row Name 09/03/20 0943          Sit-Stand Transfer    Sit-Stand [place] (Transfers)  stand by assist  -     Assistive Device (Sit-Stand Transfers)  walker, front-wheeled  -EH     Row Name 09/03/20 0943          Gait/Stairs Assessment/Training    [place] Level (Gait)  stand by assist  -     Assistive Device (Gait)  walker, front-wheeled  -EH     Distance in Feet (Gait)  200  -     Pattern (Gait)  step-through  -     Deviations/Abnormal Patterns (Gait)  antalgic;sheree decreased  -     [place] Level (Stairs)  contact guard  -     Assistive Device (Stairs)  -- no AD  -     Handrail Location (Stairs)  right side (ascending)  -     Number of Steps (Stairs)  4  -     Ascending Technique (Stairs)  step-to-step  -     Descending Technique (Stairs)  step-to-step  -     Row Name 09/03/20 0943          Mobility Assessment/Intervention    Extremity Weight-bearing Status  right lower extremity  -     Right Lower Extremity (Weight-bearing Status)  weight-bearing as tolerated (WBAT)  -       User Key  (r) = Recorded By, (t) = Taken By, (c) = Cosigned By    Initials Name Provider Type     Katarzyna Mike PTA Physical Therapy Assistant        Obj/Interventions     Row Name 09/03/20 0945          Therapeutic Exercise    Exercise Type (Therapeutic Exercise)  AROM (active range of motion)  -     Comment (Therapeutic Exercise)  Right TKR protocol exercises X10   -       User Key  (r) = Recorded By, (t) = Taken By, (c) = Cosigned By    Initials Name Provider Type     Katarzyna Mike PTA Physical Therapy Assistant        Goals/Plan    No documentation.       Clinical Impression     Row Name 09/03/20 0946          Pain Scale: Numbers Pre/Post-Treatment    Pain Location - Side  Left  -EH     Pain Location  knee  -     Pain Intervention(s)  Ambulation/increased activity;Repositioned  -     Row Name 09/03/20 0946          Plan of Care Review    Plan of Care Reviewed With   patient  -EH     Progress  improving  -     Outcome Summary  Pt tolerated treatment with c/o right knee pain. Pt able to complete TKR protocol exercises with minimal c/o difficulty. Pt ambulated 200ft with rwx, SBA. Pt navigated 4 steps with 1HR, CGA. Pt performed several sit<>stand transfers with different chair heights and with and without arms. Pt required SBA with all sit<>stand transfers.  Pt is progressing well with mobility and can safely d/c home with assist and HHPT.   -     Row Name 09/03/20 0946          Positioning and Restraints    Pre-Treatment Position  in bed  -EH     Post Treatment Position  bed  -EH     In Bed  supine;call light within reach;encouraged to call for assist;exit alarm on  -EH     In Chair  --  -     Row Name 09/03/20 0946          Pain Scale: Word Pre/Post-Treatment    Pain: Word Scale, Pretreatment  2 - mild pain  -       User Key  (r) = Recorded By, (t) = Taken By, (c) = Cosigned By    Initials Name Provider Type     Katarzyna Mike PTA Physical Therapy Assistant        Outcome Measures     Row Name 09/03/20 0951          How much help from another person do you currently need...    Turning from your back to your side while in flat bed without using bedrails?  4  -EH     Moving from lying on back to sitting on the side of a flat bed without bedrails?  4  -EH     Moving to and from a bed to a chair (including a wheelchair)?  3  -EH     Standing up from a chair using your arms (e.g., wheelchair, bedside chair)?  4  -EH     Climbing 3-5 steps with a railing?  3  -EH     To walk in hospital room?  3  -EH     AM-PAC 6 Clicks Score (PT)  21  -EH       User Key  (r) = Recorded By, (t) = Taken By, (c) = Cosigned By    Initials Name Provider Type     Katarzyna Mike PTA Physical Therapy Assistant        Physical Therapy Education                 Title: PT OT SLP Therapies (Done)     Topic: Physical Therapy (Done)     Point: Mobility training (Done)     Description:   Instruct  learner(s) on safety and technique for assisting patient out of bed, chair or wheelchair.  Instruct in the proper use of assistive devices, such as walker, crutches, cane or brace.              Patient Friendly Description:   It's important to get you on your feet again, but we need to do so in a way that is safe for you. Falling has serious consequences, and your personal safety is the most important thing of all.        When it's time to get out of bed, one of us or a family member will sit next to you on the bed to give you support.     If your doctor or nurse tells you to use a walker, crutches, a cane, or a brace, be sure you use it every time you get out of bed, even if you think you don't need it.    Learning Progress Summary           Patient Acceptance, E,TB,D, VU,DU by  at 9/3/2020 0951    Acceptance, E,TB,D, VU,DU by  at 9/2/2020 1101    Acceptance, E,D, VU,NR by MS at 9/1/2020 1557                   Point: Home exercise program (Done)     Description:   Instruct learner(s) on appropriate technique for monitoring, assisting and/or progressing patient with therapeutic exercises and activities.              Learning Progress Summary           Patient Acceptance, E,TB,D, VU,DU by  at 9/3/2020 0951    Acceptance, E,TB,D, VU,DU by  at 9/2/2020 1101    Acceptance, E,D, VU,NR by MS at 9/1/2020 1557                   Point: Body mechanics (Done)     Description:   Instruct learner(s) on proper positioning and spine alignment for patient and/or caregiver during mobility tasks and/or exercises.              Learning Progress Summary           Patient Acceptance, E,TB,D, VU,DU by  at 9/3/2020 0951    Acceptance, E,TB,D, VU,DU by  at 9/2/2020 1101    Acceptance, E,D, VU,NR by MS at 9/1/2020 1557                   Point: Precautions (Done)     Description:   Instruct learner(s) on prescribed precautions during mobility and gait tasks              Learning Progress Summary           Patient Acceptance,  E,TB,D, VU,DU by  at 9/3/2020 0951    Acceptance, E,TB,D, VU,DU by  at 9/2/2020 1101    Acceptance, E,D, VU,NR by MS at 9/1/2020 1557                               User Key     Initials Effective Dates Name Provider Type Discipline    MS 04/03/18 -  Marito Nieto, PT Physical Therapist PT     04/03/18 -  Loyda Cole, PT Physical Therapist PT     08/19/18 -  Katarzyna Mike PTA Physical Therapy Assistant PT              PT Recommendation and Plan     Plan of Care Reviewed With: patient  Progress: improving  Outcome Summary: Pt tolerated treatment with c/o right knee pain. Pt able to complete TKR protocol exercises with minimal c/o difficulty. Pt ambulated 200ft with rwx, SBA. Pt navigated 4 steps with 1HR, CGA. Pt performed several sit<>stand transfers with different chair heights and with and without arms. Pt required SBA with all sit<>stand transfers.  Pt is progressing well with mobility and can safely d/c home with assist and HHPT.      Time Calculation:   PT Charges     Row Name 09/03/20 1010             Time Calculation    Start Time  0928  -      Stop Time  1000  -      Time Calculation (min)  32 min  -      PT Received On  09/03/20  -      PT - Next Appointment  09/04/20  -         Time Calculation- PT    Total Timed Code Minutes- PT  32 minute(s)  -        User Key  (r) = Recorded By, (t) = Taken By, (c) = Cosigned By    Initials Name Provider Type     Katarzyna Mike PTA Physical Therapy Assistant        Therapy Charges for Today     Code Description Service Date Service Provider Modifiers Qty    00132385847 HC GAIT TRAINING EA 15 MIN 9/3/2020 Katarzyna Mike PTA GP 1    98251153763 HC PT THER PROC EA 15 MIN 9/3/2020 Katarzyna Mike PTA GP 1          PT G-Codes  Outcome Measure Options: AM-PAC 6 Clicks Basic Mobility (PT)  AM-PAC 6 Clicks Score (PT): 21    Katarzyna Mike PTA  9/3/2020

## 2020-09-03 NOTE — CONSULTS
FIRST UROLOGY CONSULT      Patient Identification:  NAME:  Paolo Calvo  Age:  70 y.o.   Sex:  male   :  1950   MRN:  [id number]       Chief complaint: Can't urinate.      History of present illness:  H/o post op retention s/p TKA.  H/o BPH.  ISC. Small voids.  No back pain.        Past medical history:  Past Medical History:   Diagnosis Date   • Arthritis    • Diabetes mellitus (CMS/HCC)     DIET CONTROLLED   • Disease of thyroid gland    • GERD (gastroesophageal reflux disease)    • Hyperlipidemia    • Hypertension    • Psoriasis    • Right knee pain    • Seasonal allergies    • Sleep apnea     MACHINE       Past surgical history:  Past Surgical History:   Procedure Laterality Date   • ADENOIDECTOMY     • COLONOSCOPY     • EXTRACORPOREAL SHOCK WAVE LITHOTRIPSY (ESWL)     • SEPTOPLASTY     • TONSILLECTOMY     • TOTAL KNEE ARTHROPLASTY Right 2020    Procedure: RIGHT TOTAL KNEE ARTHROPLASTY;  Surgeon: Zafar Christensen MD;  Location: [facility];  Service: Orthopedics;  Laterality: Right;       Allergies:  Sulfa antibiotics    Home medications:  Medications Prior to Admission   Medication Sig Dispense Refill Last Dose   • atorvastatin (LIPITOR) 20 MG tablet Take 1 tablet by mouth Daily. (Patient taking differently: Take 20 mg by mouth Every Night.) 90 tablet 3 2020 at 2100   • CHLORHEXIDINE GLUCONATE CLOTH EX Apply  topically. AS DIRECTED PREOP   2020 at 0330   • ketoconazole (NIZORAL) 2 % shampoo Shampoo scalp Daily as needed for flares. Let sit for 10 mins and then rinse. 120 mL 5 Past Month at Unknown time   • lisinopril-hydrochlorothiazide (PRINZIDE,ZESTORETIC) 20-12.5 MG per tablet Take 1 tablet by mouth Daily. (Patient taking differently: Take 1 tablet by mouth Every Night.) 90 tablet 3 2020 at 2100   • Melatonin 10 MG tablet Take 10 mg by mouth Every Night.   2020 at 2100   • mupirocin (BACTROBAN) 2 % nasal ointment into the nostril(s) as directed by provider. AS  DIRECTED PREOP   9/1/2020 at 0330   • omeprazole (priLOSEC) 20 MG capsule Take 20 mg by mouth. MON,WED AND FRI   9/1/2020 at 0330   • Triamcinolone Acetonide (NASACORT) 55 MCG/ACT nasal inhaler 2 sprays into the nostril(s) as directed by provider Every Night.   8/31/2020 at 2100   • aspirin 81 MG EC tablet Take 81 mg by mouth Daily. INSTRUCTED PT TO FOLLOW MD INSTRUCTIONS REGARDING HOLDING FOR SURGERY   8/18/2020 at 0800   • clobetasol (TEMOVATE) 0.05 % external solution Apply to affected areas of scalp twice a day as needed for itch/flares. 50 mL 2 More than a month at Unknown time   • Cyanocobalamin 1000 MCG capsule Take  by mouth 2 (Two) Times a Week. HOLD PRIOR TO SURG   8/30/2020 at 2100   • Fiber powder Take  by mouth Daily.           Hospital medications:    aspirin 325 mg Oral BID With Meals   atorvastatin 20 mg Oral Nightly   clobetasol  Topical Q12H   ferrous sulfate 325 mg Oral Daily With Breakfast   fluticasone 2 spray Each Nare Daily   lisinopril-hydroCHLOROthiazide (ZESTORETIC) 20-12.5 mg combo dose  Oral Q24H   pantoprazole 40 mg Oral QAM   sodium chloride 3 mL Intravenous Q12H   tamsulosin 0.4 mg Oral Daily       lactated ringers 9 mL/hr Last Rate: Stopped (09/01/20 2035)   sodium chloride 100 mL/hr      •  acetaminophen  •  diazePAM  •  docusate sodium  •  ketorolac  •  melatonin  •  Morphine **AND** naloxone  •  ondansetron **OR** ondansetron  •  oxyCODONE-acetaminophen  •  oxyCODONE-acetaminophen  •  promethazine  •  sodium chloride    Family history:  Family History   Problem Relation Age of Onset   • Malig Hyperthermia Neg Hx        Social history:  Social History     Tobacco Use   • Smoking status: Former Smoker     Years: 1.00     Types: Cigarettes   • Smokeless tobacco: Never Used   • Tobacco comment: QUIT AGE 22   Substance Use Topics   • Alcohol use: Yes     Comment: OCCASIONALLY   • Drug use: Not Currently       Review of systems:      Positive for:  Retention.    Negative for:   Fever.      Objective:  TMax 24 hours:   Temp (24hrs), Av.3 °F (36.8 °C), Min:98 °F (36.7 °C), Max:98.7 °F (37.1 °C)      Vitals Ranges:   Temp:  [98 °F (36.7 °C)-98.7 °F (37.1 °C)] 98.4 °F (36.9 °C)  Heart Rate:  [] 107  Resp:  [16-17] 16  BP: (118-147)/(74-90) 147/90    Intake/Output Last 3 shifts:  I/O last 3 completed shifts:  In: 470 [P.O.:420; IV Piggyback:50]  Out: 3345 [Urine:3320; Drains:25]     Physical Exam:    General Appearance:    Alert, cooperative, NAD   HEENT:    No trauma, pupils reactive, hearing intact   Back:     No CVA tenderness   Lungs:     Respirations unlabored, no wheezing    Heart:    RRR.   Abdomen:     Soft, NDNT, no masses, no guarding   :    Testes descended bilaterally, no nodules.  Penis normal.  No scrotal or penile rashes noted   Extremities:   No edema, no deformity   Lymphatic:   No neck or groin LAD   Skin:   No bleeding.   Neuro/Psych:   Orientation intact, mood/affect pleasant, no focal findings       Results review:   I reviewed the patient's new clinical results.    Data review:  Lab Results (last 24 hours)     ** No results found for the last 24 hours. **           Imaging:  Imaging Results (Last 24 Hours)     ** No results found for the last 24 hours. **             Assessment:       Primary osteoarthritis of right knee    BPH.  Retention.      Plan:     Increase Flomax BID.  Place 16FR jade catheter.  OK to go to rehab.  F/u next week  for VT.      Jorge Howell MD  20  12:36

## 2020-09-03 NOTE — PLAN OF CARE
Problem: Patient Care Overview  Goal: Plan of Care Review  Outcome: Ongoing (interventions implemented as appropriate)  Flowsheets (Taken 9/3/2020 0938)  Progress: improving  Plan of Care Reviewed With: patient  Outcome Summary: VSS, RA, SL, pain tolerable, up with assist of 1, I&O cath x's 2 this shift, DTV 0900, urine dark in color but without odor, urology to see today, educated on BP monitoring, hopefully home with HH today

## 2020-09-03 NOTE — PROGRESS NOTES
Orthopaedic Surgery  Progress Note  9/3/2020    Patient Name:  Paolo Calvo  YOB: 1950  Age: 70 y.o.  Medical Records Number:  [id number]  Date of Admission: 9/1/2020    No complaints except pain and urinary retention    Vitals:  Vitals:    09/02/20 1905 09/02/20 2227 09/03/20 0252 09/03/20 0700   BP: 146/90 118/74 128/80 121/77   BP Location: Right arm Right arm Left arm Right arm   Patient Position: Lying Lying Lying Sitting   Pulse: 95 93 100 100   Resp: 16 16 16 16   Temp: 98.7 °F (37.1 °C) 98 °F (36.7 °C) 98 °F (36.7 °C) 98.4 °F (36.9 °C)   TempSrc: Skin Skin Skin Skin   SpO2: 95% 96% 97% 95%   Weight:       Height:           RLE:  NVI, calf nontender, sensation intact  No signs of DVT    Incision: clean, no signs of infection    Lab Results (last 24 hours)     ** No results found for the last 24 hours. **          Assesment/Plan:    Procedures:  Right TKA  Postoperative Day: 2  Weightbearing Status:  WBAT with walker  DVT Prophylaxis:  ASA for DVT prophylaxis    Reason for Inpatient Admission:  I certify that this patient requires inpatient admission for greater than 2 midnights due to voiding difficulties (pre-existing) related to BPH     Disposition:  Urology consult pending.  Home with home health after PT today, if ok with Urology, comfortable and mobilizing safely    Zafar Sebastian  [facility]  [address] [place], [place] [address] [phone]    9/3/2020

## 2020-09-03 NOTE — PLAN OF CARE
Problem: Patient Care Overview  Goal: Plan of Care Review  Outcome: Ongoing (interventions implemented as appropriate)  Flowsheets (Taken 9/3/2020 3245)  Progress: improving  Plan of Care Reviewed With: patient  Outcome Summary: Pt tolerated treatment with c/o right knee pain. Pt able to complete TKR protocol exercises with minimal c/o difficulty. Pt ambulated 200ft with rwx, SBA. Pt navigated 4 steps with 1HR, CGA. Pt performed several sit<>stand transfers with different chair heights and with and without arms. Pt required SBA with all sit<>stand transfers.  Pt is progressing well with mobility and can safely d/c home with assist and HHPT.    ..Patient was wearing a face mask during this therapy encounter. Therapist used appropriate personal protective equipment including eye protection, mask, and gloves.  Mask used was standard procedure mask. Appropriate PPE was worn during the entire therapy session. Hand hygiene was completed before and after therapy session. Patient is not in enhanced droplet precautions.   PTA also present: Katarzyna

## 2020-09-04 ENCOUNTER — NURSE TRIAGE (OUTPATIENT)
Dept: CALL CENTER | Facility: HOSPITAL | Age: 70
End: 2020-09-04

## 2020-09-04 NOTE — TELEPHONE ENCOUNTER
"Caller asking when he can remove the dressing from his Knee, surgery done on Tues. Advised to call the Surgeon's office for this instruction. Caller agrees to follow care advice.     Reason for Disposition  • [1] Caller requesting NON-URGENT health information AND [2] PCP's office is the best resource    Additional Information  • Negative: [1] Caller is not with the adult (patient) AND [2] reporting urgent symptoms  • Negative: Lab result questions  • Negative: Medication questions  • Negative: Caller can't be reached by phone  • Negative: Caller has already spoken to PCP or another triager  • Negative: RN needs further essential information from caller in order to complete triage  • Negative: Requesting regular office appointment    Answer Assessment - Initial Assessment Questions  1. REASON FOR CALL or QUESTION: \"What is your reason for calling today?\" or \"How can I best help you?\" or \"What question do you have that I can help answer?\"      When can I take my dressing off my knee    Protocols used: INFORMATION ONLY CALL - NO TRIAGE-ADULT-      "

## 2020-09-22 ENCOUNTER — EPISODE CHANGES (OUTPATIENT)
Dept: CASE MANAGEMENT | Facility: OTHER | Age: 70
End: 2020-09-22

## 2021-03-19 ENCOUNTER — BULK ORDERING (OUTPATIENT)
Dept: CASE MANAGEMENT | Facility: OTHER | Age: 71
End: 2021-03-19

## 2021-03-19 DIAGNOSIS — Z23 IMMUNIZATION DUE: ICD-10-CM

## 2021-08-02 VITALS
SYSTOLIC BLOOD PRESSURE: 102 MMHG | WEIGHT: 185 LBS | SYSTOLIC BLOOD PRESSURE: 174 MMHG | HEART RATE: 81 BPM | DIASTOLIC BLOOD PRESSURE: 95 MMHG | SYSTOLIC BLOOD PRESSURE: 156 MMHG | HEART RATE: 104 BPM | OXYGEN SATURATION: 97 % | RESPIRATION RATE: 14 BRPM | HEART RATE: 85 BPM | OXYGEN SATURATION: 99 % | HEART RATE: 115 BPM | DIASTOLIC BLOOD PRESSURE: 109 MMHG | OXYGEN SATURATION: 96 % | SYSTOLIC BLOOD PRESSURE: 124 MMHG | RESPIRATION RATE: 10 BRPM | DIASTOLIC BLOOD PRESSURE: 70 MMHG | SYSTOLIC BLOOD PRESSURE: 104 MMHG | RESPIRATION RATE: 16 BRPM | HEART RATE: 86 BPM | SYSTOLIC BLOOD PRESSURE: 107 MMHG | HEART RATE: 95 BPM | DIASTOLIC BLOOD PRESSURE: 74 MMHG | SYSTOLIC BLOOD PRESSURE: 155 MMHG | HEART RATE: 82 BPM | SYSTOLIC BLOOD PRESSURE: 111 MMHG | DIASTOLIC BLOOD PRESSURE: 80 MMHG | RESPIRATION RATE: 18 BRPM | SYSTOLIC BLOOD PRESSURE: 106 MMHG | TEMPERATURE: 97.8 F | DIASTOLIC BLOOD PRESSURE: 77 MMHG | OXYGEN SATURATION: 95 % | HEART RATE: 89 BPM | OXYGEN SATURATION: 98 % | TEMPERATURE: 97.5 F | RESPIRATION RATE: 6 BRPM | DIASTOLIC BLOOD PRESSURE: 71 MMHG | HEIGHT: 65 IN | DIASTOLIC BLOOD PRESSURE: 73 MMHG | RESPIRATION RATE: 12 BRPM

## 2021-08-04 ENCOUNTER — OFFICE (OUTPATIENT)
Dept: URBAN - METROPOLITAN AREA PATHOLOGY 4 | Facility: PATHOLOGY | Age: 71
End: 2021-08-04
Payer: COMMERCIAL

## 2021-08-04 ENCOUNTER — AMBULATORY SURGICAL CENTER (OUTPATIENT)
Dept: URBAN - METROPOLITAN AREA SURGERY 17 | Facility: SURGERY | Age: 71
End: 2021-08-04
Payer: COMMERCIAL

## 2021-08-04 DIAGNOSIS — K64.4 RESIDUAL HEMORRHOIDAL SKIN TAGS: ICD-10-CM

## 2021-08-04 DIAGNOSIS — Z86.010 PERSONAL HISTORY OF COLONIC POLYPS: ICD-10-CM

## 2021-08-04 DIAGNOSIS — D12.3 BENIGN NEOPLASM OF TRANSVERSE COLON: ICD-10-CM

## 2021-08-04 LAB
GI HISTOLOGY: A. UNSPECIFIED: (no result)
GI HISTOLOGY: B. UNSPECIFIED: (no result)
GI HISTOLOGY: PDF REPORT: (no result)

## 2021-08-04 PROCEDURE — 45385 COLONOSCOPY W/LESION REMOVAL: CPT | Mod: 33 | Performed by: INTERNAL MEDICINE

## 2021-08-04 PROCEDURE — 88305 TISSUE EXAM BY PATHOLOGIST: CPT | Mod: 33 | Performed by: INTERNAL MEDICINE

## 2021-08-04 RX ORDER — OMEPRAZOLE 20 MG/1
CAPSULE, DELAYED RELEASE ORAL
Qty: 90 | Refills: 3 | Status: ACTIVE

## 2021-08-04 NOTE — SERVICEHPINOTES
71-year-old gentleman whose mother had colon cancer but in her 70s so that is not a risk factor.  However he also has a personal history of polyps and hemorrhoids.  He presents for a follow-up colonoscopy.

## 2021-08-10 ENCOUNTER — TRANSCRIBE ORDERS (OUTPATIENT)
Dept: ADMINISTRATIVE | Facility: HOSPITAL | Age: 71
End: 2021-08-10

## 2021-08-10 DIAGNOSIS — E04.1 THYROID NODULE: Primary | ICD-10-CM

## 2021-08-17 ENCOUNTER — HOSPITAL ENCOUNTER (OUTPATIENT)
Dept: ULTRASOUND IMAGING | Facility: HOSPITAL | Age: 71
Discharge: HOME | End: 2021-08-17
Admitting: INTERNAL MEDICINE

## 2021-08-17 DIAGNOSIS — E04.1 THYROID NODULE: ICD-10-CM

## 2021-08-17 PROCEDURE — 76536 US EXAM OF HEAD AND NECK: CPT

## 2021-09-02 ENCOUNTER — APPOINTMENT (OUTPATIENT)
Dept: ULTRASOUND IMAGING | Facility: HOSPITAL | Age: 71
End: 2021-09-02

## 2021-10-20 ENCOUNTER — TRANSCRIBE ORDERS (OUTPATIENT)
Dept: ADMINISTRATIVE | Facility: HOSPITAL | Age: 71
End: 2021-10-20

## 2021-10-20 DIAGNOSIS — R97.20 ELEVATED PSA: Primary | ICD-10-CM

## 2021-11-16 ENCOUNTER — APPOINTMENT (OUTPATIENT)
Dept: MRI IMAGING | Facility: HOSPITAL | Age: 71
End: 2021-11-16

## 2021-11-16 ENCOUNTER — HOSPITAL ENCOUNTER (OUTPATIENT)
Dept: MRI IMAGING | Facility: HOSPITAL | Age: 71
Discharge: HOME | End: 2021-11-16
Admitting: UROLOGY

## 2021-11-16 DIAGNOSIS — R97.20 ELEVATED PSA: ICD-10-CM

## 2021-11-16 PROCEDURE — 0 GADOBENATE DIMEGLUMINE 529 MG/ML SOLUTION: Performed by: UROLOGY

## 2021-11-16 PROCEDURE — A9577 INJ MULTIHANCE: HCPCS | Performed by: UROLOGY

## 2021-11-16 PROCEDURE — 82565 ASSAY OF CREATININE: CPT

## 2021-11-16 PROCEDURE — 72197 MRI PELVIS W/O & W/DYE: CPT

## 2021-11-16 RX ADMIN — GADOBENATE DIMEGLUMINE 17 ML: 529 INJECTION, SOLUTION INTRAVENOUS at 18:20

## 2021-11-26 LAB — CREAT BLDA-MCNC: 1.2 MG/DL (ref 0.6–1.3)

## 2022-02-24 ENCOUNTER — OFFICE (OUTPATIENT)
Dept: URBAN - METROPOLITAN AREA CLINIC 75 | Facility: CLINIC | Age: 72
End: 2022-02-24

## 2022-02-24 VITALS
HEIGHT: 65 IN | SYSTOLIC BLOOD PRESSURE: 144 MMHG | OXYGEN SATURATION: 100 % | WEIGHT: 190 LBS | HEART RATE: 92 BPM | DIASTOLIC BLOOD PRESSURE: 62 MMHG

## 2022-02-24 DIAGNOSIS — Z80.0 FAMILY HISTORY OF MALIGNANT NEOPLASM OF DIGESTIVE ORGANS: ICD-10-CM

## 2022-02-24 DIAGNOSIS — K21.9 GASTRO-ESOPHAGEAL REFLUX DISEASE WITHOUT ESOPHAGITIS: ICD-10-CM

## 2022-02-24 DIAGNOSIS — Z86.010 PERSONAL HISTORY OF COLONIC POLYPS: ICD-10-CM

## 2022-02-24 DIAGNOSIS — K58.1 IRRITABLE BOWEL SYNDROME WITH CONSTIPATION: ICD-10-CM

## 2022-02-24 DIAGNOSIS — K29.70 GASTRITIS, UNSPECIFIED, WITHOUT BLEEDING: ICD-10-CM

## 2022-02-24 PROBLEM — K63.89 OTHER SPECIFIED DISEASES OF INTESTINE: Status: ACTIVE | Noted: 2021-08-04

## 2022-02-24 PROBLEM — K63.5 POLYP OF COLON: Status: ACTIVE | Noted: 2021-08-04

## 2022-02-24 PROBLEM — K64.1 INTERNAL HEMORRHOIDS GRADE II: Status: ACTIVE | Noted: 2020-01-27

## 2022-02-24 PROCEDURE — 99214 OFFICE O/P EST MOD 30 MIN: CPT | Performed by: NURSE PRACTITIONER

## 2022-09-06 NOTE — PROGRESS NOTES
Continued Stay Note  [facility]     Patient Name: Paolo Calvo  MRN: [id number]  Today's Date: 9/1/2020    Admit Date: 9/1/2020    Discharge Plan     Row Name 09/01/20 1150       Plan    Plan  [facility]    Patient/Family in Agreement with Plan  yes    Plan Comments  Spoke with pt, verified correct information on facesheet and explained the role of CCP. Pt would like to d/c home with [facility], referral sent in Epic to [facility]. Plan will be to d/c home with [facility] and family support. No other needs identified.         Discharge Codes    No documentation.             Elda Bass RN     Airway patent, Nasal mucosa clear. Mouth with normal mucosa. Throat has no vesicles, no oropharyngeal exudates and uvula is midline. R eye lid w ecchymosis.

## 2022-09-13 NOTE — ANESTHESIA PROCEDURE NOTES
Peripheral Block    Pre-sedation assessment completed: 9/1/2020 6:31 AM    Patient reassessed immediately prior to procedure    Patient location during procedure: holding area  Start time: 9/1/2020 6:31 AM  Stop time: 9/1/2020 6:41 AM  Reason for block: at surgeon's request and post-op pain management  Performed by  Anesthesiologist: Thomas Huerta MD  Preanesthetic Checklist  Completed: patient identified, site marked, surgical consent, pre-op evaluation, timeout performed, IV checked, risks and benefits discussed and monitors and equipment checked  Prep:  Sterile barriers:cap, gloves, gown, mask and sterile barriers  Prep: ChloraPrep  Patient monitoring: blood pressure monitoring, continuous pulse oximetry and EKG  Procedure  Sedation:yes    Guidance:ultrasound guided  ULTRASOUND INTERPRETATION.  Using ultrasound guidance a 21 G gauge needle was placed in close proximity to the femoral nerve, at which point, under ultrasound guidance anesthetic was injected in the area of the nerve and spread of the anesthesia was seen on ultrasound in close proximity thereto.  There were no abnormalities seen on ultrasound; a digital image was taken; and the patient tolerated the procedure with no complications. Images:still images obtained    Laterality:right  Block Type:adductor canal block  Injection Technique:single-shot  Needle Type:echogenic  Needle Gauge:21 G      Medications Used: ropivacaine (NAROPIN) 0.5 % injection, 30 mL  mepivacaine (CARBOCAINE) 1.5 % injection, 10 mL      Post Assessment  Injection Assessment: negative aspiration for heme, no paresthesia on injection and incremental injection  Patient Tolerance:comfortable throughout block  Complications:no             no chest pain/no palpitations/no dyspnea on exertion/no orthopnea/no paroxysmal nocturnal dyspnea/no peripheral edema/no claudication

## (undated) DEVICE — GLV SURG BIOGEL LTX PF 7 1/2

## (undated) DEVICE — APPL CHLORAPREP HI/LITE 26ML ORNG

## (undated) DEVICE — NEEDLE, QUINCKE, 20GX3.5": Brand: MEDLINE

## (undated) DEVICE — DECANT BG O JET

## (undated) DEVICE — SYRINGE, LUER LOCK, 60ML: Brand: MEDLINE

## (undated) DEVICE — CONTAINER,SPECIMEN,OR STERILE,4OZ: Brand: MEDLINE

## (undated) DEVICE — PK KN TOTL 40

## (undated) DEVICE — UNDERCAST PADDING: Brand: DEROYAL

## (undated) DEVICE — GLV SURG PREMIERPRO ORTHO LTX PF SZ7.5 BRN

## (undated) DEVICE — DUAL CUT SAGITTAL BLADE

## (undated) DEVICE — ANTIBACTERIAL UNDYED BRAIDED (POLYGLACTIN 910), SYNTHETIC ABSORBABLE SUTURE: Brand: COATED VICRYL

## (undated) DEVICE — TRAP FLD MINIVAC MEGADYNE 100ML

## (undated) DEVICE — DRSNG GZ PETROLTM XEROFORM CURAD 1X8IN STRL

## (undated) DEVICE — DRAPE,REIN 53X77,STERILE: Brand: MEDLINE

## (undated) DEVICE — PENCL E/S ULTRAVAC TELESCP NOSE HOLSTR 10FT

## (undated) DEVICE — BNDG ELAS ELITE V/CLOSE 4IN 5YD LF STRL

## (undated) DEVICE — STPLR SKIN VISISTAT WD 35CT

## (undated) DEVICE — PAD,ABDOMINAL,8"X10",ST,LF: Brand: MEDLINE

## (undated) DEVICE — BNDG ELAS CO-FLEX SLF ADHR 6IN 5YD LF STRL

## (undated) DEVICE — KT DRN EVAC WND PVC PCH WTROC RND 10F400

## (undated) DEVICE — SPNG LAP 18X18IN LF STRL PK/5